# Patient Record
Sex: MALE | Race: WHITE
[De-identification: names, ages, dates, MRNs, and addresses within clinical notes are randomized per-mention and may not be internally consistent; named-entity substitution may affect disease eponyms.]

---

## 2017-11-15 ENCOUNTER — HOSPITAL ENCOUNTER (EMERGENCY)
Dept: HOSPITAL 11 - JP.ED | Age: 52
Discharge: HOME | End: 2017-11-15
Payer: MEDICARE

## 2017-11-15 DIAGNOSIS — N32.0: Primary | ICD-10-CM

## 2017-11-15 DIAGNOSIS — E03.9: ICD-10-CM

## 2017-11-15 DIAGNOSIS — Z79.899: ICD-10-CM

## 2017-11-15 DIAGNOSIS — B20: ICD-10-CM

## 2017-11-15 DIAGNOSIS — Z79.82: ICD-10-CM

## 2017-11-15 DIAGNOSIS — I10: ICD-10-CM

## 2017-11-15 DIAGNOSIS — Z88.8: ICD-10-CM

## 2017-11-15 DIAGNOSIS — Z87.891: ICD-10-CM

## 2017-11-15 NOTE — EDM.PDOC
ED HPI GENERAL MEDICAL PROBLEM





- General


Chief Complaint: Genitourinary Problem


Stated Complaint: UNABLE TO VOID


Time Seen by Provider: 11/15/17 12:54


Source of Information: Reports: Patient


History Limitations: Reports: No Limitations





- History of Present Illness


INITIAL COMMENTS - FREE TEXT/NARRATIVE: 


this patient has been having some urinary difficulties recently and yesterday 

underwent some type of a urodynamic procedure at the [facility] 

by Dr Cordero. He has been unable to void since about 0600 this am.








- Related Data


 Allergies











Allergy/AdvReac Type Severity Reaction Status Date / Time


 


abacavir Allergy Severe Fever Verified 11/15/17 12:39











Home Meds: 


 Home Meds





Alpha-D-Galactosidase [Beano] 2 each PO ASDIRECTED PRN 05/30/14 [History]


Ascorbic Acid [Vitamin C] 500 mg PO BID 05/30/14 [History]


Aspirin [Adult Low Dose Aspirin EC] 81 mg PO DAILY 05/30/14 [History]


Ca Cmb No.1/Vit D3/B-6/FA/B12 [Vitamin D3 1,000 Unit] 1 each PO DAILY 05/30/14 [

History]


Calcium Carbonate/Vitamin D3 [Calcium 600 + Vit D Tablet] 1 each PO BID 05/30/ 14 [History]


Cyanocobalamin (Vitamin B-12) [Vitamin B-12] 500 mcg PO DAILY 05/30/14 [History]


Diphenoxylate HCl/Atropine [Lomotil] 1 - 2 tab PO TID PRN 05/30/14 [History]


Efavirenz [Sustiva] 600 mg PO BEDTIME 05/30/14 [History]


Fish Oil/Omega-3 Fatty Acids [Fish Oil] 1 each PO DAILY 05/30/14 [History]


Levothyroxine Sodium [Synthroid] 175 mcg PO DAILY 05/30/14 [History]


Psyllium with Sucrose [Metamucil] 1 each PO ASDIRECTED PRN 05/30/14 [History]


Raltegravir [Isentress] 400 mg PO BID 05/30/14 [History]


Solifenacin [Vesicare] 10 mg PO DAILY 05/30/14 [History]


Tamsulosin [Tamsulosin 24 Hr] 0.4 mg PO DAILY 05/30/14 [History]


lamiVUDine/Zidovudine [Combivir 150-300 MG] 1 tab PO BID 05/30/14 [History]


Alendronate Sodium [Fosamax] 70 mg PO ASDIRECTED 11/15/17 [History]


Amoxicillin 1 tab PO ASDIRECTED PRN 11/15/17 [History]


Baclofen 10 mg PO BID 11/15/17 [History]


Cephalexin 1 tab PO ONETIME 11/15/17 [History]


Finasteride 1 tab PO DAILY 11/15/17 [History]


Phosphorus #1 [Virt-Phos 250 Neutral Tablet] 1 tab PO BID 11/15/17 [History]


Potassium Citrate [Potassium Citrate ER] 1 tab PO BID 11/15/17 [History]


Sodium Bicarbonate 2 tab PO BID 11/15/17 [History]


oxyCODONE 1 tab PO ASDIRECTED PRN 11/15/17 [History]











Past Medical History


Cardiovascular History: Reports: Hypertension


Gastrointestinal History: Reports: Other (See Below)


Other Gastrointestinal History: tumor 2009 removal in left groin, right groin 

tumor removal in 2014


Genitourinary History: Reports: Other (See Below)


Other Genitourinary History: cystogram of bladder 2017


Musculoskeletal History: Reports: Fracture


Other Musculoskeletal History: fracture right wrist and right hip 2015 screws 

in hip


Neurological History: Reports: Other (See Below)


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


Endocrine/Metabolic History: Reports: Hypothyroidism


Immunologic History: Reports: HIV


Oncologic (Cancer) History: Reports: Lung, Squamous Cell Carcinoma, Other (See 

Below)


Other Oncologic History: anal cancer





- Infectious Disease History


Infectious Disease History: Reports: HIV-Human Immunodeficiency Virus





- Past Surgical History


Respiratory Surgical History: Reports: Lung Resection


Other Respiratory Surgeries/Procedures: right lung resection 2008


GI Surgical History: Reports: Other (See Below)


Other GI Surgeries/Procedures: anal cancer in 2009 treated with radiation and 

chemo





Social & Family History





- Tobacco Use


Smoking Status *Q: Former Smoker


Years of Tobacco use: 25


Packs/Tins Daily: 1


Used Tobacco, but Quit: Yes


Month Tobacco Last Used: december


Second Hand Smoke Exposure: No





- Caffeine Use


Caffeine Use: Reports: Coffee





- Alcohol Use


Days Per Week of Alcohol Use: 0





- Recreational Drug Use


Recreational Drug Use: No





ED ROS GENERAL





- Review of Systems


Review Of Systems: ROS reveals no pertinent complaints other than HPI.





ED EXAM, RENAL/





- Physical Exam


Exam: See Below


Exam Limited By: No Limitations (in [place] by Dr. Louie Flores in)


General Appearance: Alert, WD/WN ( at the), No Apparent Distress ( [place] 

[facility] at the he has been unable to void)


 (Male) Exam: Other ( the bladder is distended but is nontender. Rectal exam 

was not done. This patient has had some previous malignancies in the scrotum 

and groin areas)





Course





- Vital Signs


Last Recorded V/S: 





 Last Vital Signs











Temp  36.8 C   11/15/17 13:25


 


Pulse  81   11/15/17 13:25


 


Resp  16   11/15/17 13:25


 


BP  163/107 H  11/15/17 13:25


 


Pulse Ox  94 L  11/15/17 13:25














- Orders/Labs/Meds


Meds: 





Medications














Discontinued Medications














Generic Name Dose Route Start Last Admin





  Trade Name Dilia  PRN Reason Stop Dose Admin


 


Lidocaine HCl  10 ml  11/15/17 13:47  11/15/17 13:51





  Xylocaine 2% Jelly  MUCMEM  11/15/17 13:48  10 ml





  ONETIME ONE   Administration














- Re-Assessments/Exams


Free Text/Narrative Re-Assessment/Exam: 





11/15/17 14:05


 a Guzman catheter was placed. See nurse's note for the amount of drainage


. A call was placed to the urology clinic and the nurse said that he can be 

seen at 1:15 tomorrow


. He should arrive at 1:00 PM.





Departure





- Departure


Time of Disposition: 14:06


Disposition: Home, Self-Care 01


Condition: Fair


Clinical Impression: 


 Bladder outlet obstruction








- Discharge Information


Referrals: 


PCP,None [Primary Care Provider] - 


Additional Instructions: 


followup tomorrow at 1:00 pm tomorrow at the Urology clinic to see Dr Cordero

## 2018-02-15 ENCOUNTER — HOSPITAL ENCOUNTER (EMERGENCY)
Dept: HOSPITAL 11 - JP.ED | Age: 53
Discharge: HOME | End: 2018-02-15
Payer: MEDICARE

## 2018-02-15 DIAGNOSIS — Z87.891: ICD-10-CM

## 2018-02-15 DIAGNOSIS — I10: ICD-10-CM

## 2018-02-15 DIAGNOSIS — E03.9: ICD-10-CM

## 2018-02-15 DIAGNOSIS — Z88.8: ICD-10-CM

## 2018-02-15 DIAGNOSIS — Z79.82: ICD-10-CM

## 2018-02-15 DIAGNOSIS — B20: ICD-10-CM

## 2018-02-15 DIAGNOSIS — R33.8: Primary | ICD-10-CM

## 2018-02-15 DIAGNOSIS — Z79.899: ICD-10-CM

## 2018-02-15 NOTE — EDM.PDOC
ED HPI GENERAL MEDICAL PROBLEM





- General


Chief Complaint: Genitourinary Problem


Stated Complaint: CAN'T URINATE


Time Seen by Provider: 02/15/18 00:30


Source of Information: Reports: Patient


History Limitations: Reports: No Limitations





- History of Present Illness


INITIAL COMMENTS - FREE TEXT/NARRATIVE: 





52-year-old male who has a history of chronic lymphedema of the scrotum and 

also urinary retention has again developed acute urinary retention for the last 

10 hours. No fevers or chills. He also has a history of enlarged prostate.


Onset: Gradual


Duration: Hour(s): (Last 10 hours)


Severity: Moderate


Associated Symptoms: Denies: Fever/Chills, Nausea/Vomiting, Shortness of Breath

, Weakness


  ** bladder


Pain Score (Numeric/FACES): 5





- Related Data


 Allergies











Allergy/AdvReac Type Severity Reaction Status Date / Time


 


abacavir Allergy Severe Fever Verified 02/15/18 00:43











Home Meds: 


 Home Meds





Alpha-D-Galactosidase [Beano] 2 each PO ASDIRECTED PRN 05/30/14 [History]


Ascorbic Acid [Vitamin C] 500 mg PO DAILY 05/30/14 [History]


Aspirin [Adult Low Dose Aspirin EC] 81 mg PO DAILY 05/30/14 [History]


Ca Cmb No.1/Vit D3/B-6/FA/B12 [Vitamin D3 1,000 Unit] 1 each PO DAILY 05/30/14 [

History]


Calcium Carbonate/Vitamin D3 [Calcium 600 + Vit D Tablet] 1 each PO BID 05/30/ 14 [History]


Cyanocobalamin (Vitamin B-12) [Vitamin B-12] 500 mcg PO DAILY 05/30/14 [History]


Diphenoxylate HCl/Atropine [Lomotil] 1 - 2 tab PO TID PRN 05/30/14 [History]


Efavirenz [Sustiva] 600 mg PO BEDTIME 05/30/14 [History]


Fish Oil/Omega-3 Fatty Acids [Fish Oil] 1 each PO DAILY 05/30/14 [History]


Levothyroxine Sodium [Synthroid] 175 mcg PO DAILY 05/30/14 [History]


Psyllium with Sucrose [Metamucil] 1 each PO ASDIRECTED PRN 05/30/14 [History]


Raltegravir [Isentress] 400 mg PO BID 05/30/14 [History]


Solifenacin [Vesicare] 10 mg PO DAILY 05/30/14 [History]


Tamsulosin [Tamsulosin 24 Hr] 0.4 mg PO DAILY 05/30/14 [History]


lamiVUDine/Zidovudine [Combivir 150-300 MG] 1 tab PO BID 05/30/14 [History]


Alendronate Sodium [Fosamax] 70 mg PO ASDIRECTED 11/15/17 [History]


Amoxicillin 1 tab PO ASDIRECTED PRN 11/15/17 [History]


Baclofen 10 mg PO BID 11/15/17 [History]


Finasteride 1 tab PO DAILY 11/15/17 [History]


Phosphorus #1 [Virt-Phos 250 Neutral Tablet] 1 tab PO BID 11/15/17 [History]


Potassium Citrate [Potassium Citrate ER] 1 tab PO BID 11/15/17 [History]


Sodium Bicarbonate 2 tab PO BID 11/15/17 [History]


oxyCODONE 1 tab PO Q6H PRN 11/15/17 [History]


Dapsone [Dapsone] 1 tab PO DAILY 02/15/18 [History]











Past Medical History


Cardiovascular History: Reports: Hypertension


Gastrointestinal History: Reports: Other (See Below)


Other Gastrointestinal History: tumor 2009 removal in left groin, right groin 

tumor removal in 2014


Genitourinary History: Reports: Prostate Disorder, Other (See Below)


Other Genitourinary History: cystogram of bladder 2017


Musculoskeletal History: Reports: Fracture


Other Musculoskeletal History: fracture right wrist and right hip 2015 screws 

in hip


Neurological History: Reports: Other (See Below)


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


Endocrine/Metabolic History: Reports: Hypothyroidism


Hematologic History: Reports: Blood Transfusion(s)


Immunologic History: Reports: HIV


Oncologic (Cancer) History: Reports: Lung, Squamous Cell Carcinoma, Other (See 

Below)


Other Oncologic History: anal cancer





- Infectious Disease History


Infectious Disease History: Reports: HIV-Human Immunodeficiency Virus





- Past Surgical History


Respiratory Surgical History: Reports: Lung Resection


Other Respiratory Surgeries/Procedures: right lung resection 2008


GI Surgical History: Reports: Colonoscopy, Other (See Below)


Other GI Surgeries/Procedures: anal cancer in 2009 treated with radiation and 

chemo





Social & Family History





- Tobacco Use


Smoking Status *Q: Former Smoker


Years of Tobacco use: 25


Packs/Tins Daily: 1


Used Tobacco, but Quit: Yes


Month Tobacco Last Used: 2008


Second Hand Smoke Exposure: No





- Caffeine Use


Caffeine Use: Reports: Coffee





- Alcohol Use


Days Per Week of Alcohol Use: 0





- Recreational Drug Use


Recreational Drug Use: No





ED ROS GENERAL





- Review of Systems


Review Of Systems: See Below


Constitutional: Denies: Fever


Respiratory: Denies: Shortness of Breath


GI/Abdominal: Reports: Abdominal Pain.  Denies: Nausea, Vomiting


: Reports: Urinary Retention





ED EXAM, RENAL/





- Physical Exam


Exam: See Below


Exam Limited By: No Limitations


General Appearance: Alert, Mild Distress (Patient is fairly uncomfortable)


Respiratory/Chest: No Respiratory Distress


GI/Abdominal: Soft, Other (There is some fullness of the lower abdomen)


 (Male) Exam: Other (Marked scrotal swelling and erythema, according to the 

patient is chronic)


Neurological: Alert, Oriented





Course





- Vital Signs


Last Recorded V/S: 


 Last Vital Signs











Temp  98 F   02/15/18 00:49


 


Pulse  85   02/15/18 00:49


 


Resp  16   02/15/18 00:49


 


BP  167/104 H  02/15/18 00:49


 


Pulse Ox  96   02/15/18 00:49














- Orders/Labs/Meds


Orders: 


 Active Orders 24 hr











 Category Date Time Status


 


 Insert Guzman Catheter [Insert Urinary Catheter] [OM.PC] Care  02/15/18 01:15 

Ordered





 Q24H   


 


 Urinary Catheter Assessment [RC] ASDIRECTED Care  02/15/18 01:16 Active














- Re-Assessments/Exams


Free Text/Narrative Re-Assessment/Exam: 





02/15/18 01:09


A 16-gauge catheter was placed through the urethra into the bladder. The 

urethra at the penile head was strictured, there was very little resistance for 

the rest of the placement of the catheter. This was done under sterile 

conditions.


02/15/18 01:28


Bladder was released of 600-700 mL of urine and the symptoms markedly improved. 

Patient is going to make an appointment with his primary doctor on Friday 

morning which would be a day and a half, to get this removed and discuss the 

possible causes.





Departure





- Departure


Time of Disposition: 01:55


Disposition: Home, Self-Care 01


Condition: Good


Clinical Impression: 


 Retention of urine








- Discharge Information


Instructions:  Acute Urinary Retention, Male


Referrals: 


Jacinto Fung MD [Primary Care Provider] - 


Forms:  ED Department Discharge


Care Plan Goals: 


Make an appointment to have the Guzman tube removed on Friday, return sooner if 

you develop problems with the catheter or others concerns





- My Orders


Last 24 Hours: 


My Active Orders





02/15/18 01:15


Insert Guzman Catheter [Insert Urinary Catheter] [OM.PC] Q24H 





02/15/18 01:16


Urinary Catheter Assessment [RC] ASDIRECTED 














- Assessment/Plan


Last 24 Hours: 


My Active Orders





02/15/18 01:15


Insert Guzman Catheter [Insert Urinary Catheter] [OM.PC] Q24H 





02/15/18 01:16


Urinary Catheter Assessment [RC] ASDIRECTED

## 2018-03-31 ENCOUNTER — HOSPITAL ENCOUNTER (EMERGENCY)
Dept: HOSPITAL 11 - JP.ED | Age: 53
Discharge: HOME | End: 2018-03-31
Payer: MEDICARE

## 2018-03-31 DIAGNOSIS — Z79.899: ICD-10-CM

## 2018-03-31 DIAGNOSIS — B20: ICD-10-CM

## 2018-03-31 DIAGNOSIS — I10: ICD-10-CM

## 2018-03-31 DIAGNOSIS — T83.011A: Primary | ICD-10-CM

## 2018-03-31 DIAGNOSIS — Z88.8: ICD-10-CM

## 2018-03-31 DIAGNOSIS — Z79.82: ICD-10-CM

## 2018-03-31 DIAGNOSIS — Z87.891: ICD-10-CM

## 2018-03-31 DIAGNOSIS — E03.9: ICD-10-CM

## 2018-03-31 NOTE — EDM.PDOC
ED HPI GENERAL MEDICAL PROBLEM





- General


Chief Complaint: Genitourinary Problem


Stated Complaint: CATH ISSUES


Time Seen by Provider: 03/31/18 22:50


Source of Information: Reports: Patient


History Limitations: Reports: No Limitations





- History of Present Illness


INITIAL COMMENTS - FREE TEXT/NARRATIVE: 





53-year-old male with an indwelling Guzman catheter had a malfunction of the 

catheter bag as it was leaking. He came in because he thought he had to have 

the Guzman replaced, he was unaware that the bag itself could be replaced. He 

has no symptoms.


Onset: Unknown/Unsure





- Related Data


 Allergies











Allergy/AdvReac Type Severity Reaction Status Date / Time


 


abacavir Allergy Severe Fever Verified 03/31/18 22:17











Home Meds: 


 Home Meds





Alpha-D-Galactosidase [Beano] 2 each PO ASDIRECTED PRN 05/30/14 [History]


Ascorbic Acid [Vitamin C] 500 mg PO DAILY 05/30/14 [History]


Aspirin [Adult Low Dose Aspirin EC] 81 mg PO DAILY 05/30/14 [History]


Ca Cmb No.1/Vit D3/B-6/FA/B12 [Vitamin D3 1,000 Unit] 1 each PO DAILY 05/30/14 [

History]


Calcium Carbonate/Vitamin D3 [Calcium 600 + Vit D Tablet] 1 each PO DAILY 05/30/ 14 [History]


Cyanocobalamin (Vitamin B-12) [Vitamin B-12] 500 mcg PO DAILY 05/30/14 [History]


Diphenoxylate HCl/Atropine [Lomotil] 1 - 2 tab PO TID PRN 05/30/14 [History]


Efavirenz [Sustiva] 600 mg PO BEDTIME 05/30/14 [History]


Fish Oil/Omega-3 Fatty Acids [Fish Oil] 1 each PO DAILY 05/30/14 [History]


Levothyroxine Sodium [Synthroid] 175 mcg PO DAILY 05/30/14 [History]


Psyllium with Sucrose [Metamucil] 1 each PO ASDIRECTED PRN 05/30/14 [History]


Raltegravir [Isentress] 400 mg PO BID 05/30/14 [History]


Solifenacin [Vesicare] 10 mg PO DAILY 05/30/14 [History]


Tamsulosin [Tamsulosin 24 Hr] 0.4 mg PO DAILY 05/30/14 [History]


lamiVUDine/Zidovudine [Combivir 150-300 MG] 1 tab PO BID 05/30/14 [History]


Alendronate Sodium [Fosamax] 70 mg PO ASDIRECTED 11/15/17 [History]


Amoxicillin 1 tab PO ASDIRECTED PRN 11/15/17 [History]


Baclofen 10 mg PO BID 11/15/17 [History]


Finasteride 1 tab PO DAILY 11/15/17 [History]


Phosphorus #1 [Virt-Phos 250 Neutral Tablet] 1 tab PO DAILY 11/15/17 [History]


Potassium Citrate [Potassium Citrate ER] 1 tab PO BID 11/15/17 [History]


Sodium Bicarbonate 2 tab PO BID 11/15/17 [History]


oxyCODONE 1 tab PO Q6H PRN 11/15/17 [History]


Dapsone 1 tab PO DAILY 02/15/18 [History]











Past Medical History


HEENT History: Reports: Impaired Vision


Cardiovascular History: Reports: Hypertension


Gastrointestinal History: Reports: Other (See Below)


Other Gastrointestinal History: tumor 2009 removal in left groin, right groin 

tumor removal in 2014


Genitourinary History: Reports: Prostate Disorder, Other (See Below)


Other Genitourinary History: cystogram of bladder 2017


Musculoskeletal History: Reports: Fracture


Other Musculoskeletal History: fracture right wrist and right hip 2015 screws 

in hip


Neurological History: Reports: Other (See Below)


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


Endocrine/Metabolic History: Reports: Hypothyroidism


Hematologic History: Reports: Blood Transfusion(s)


Immunologic History: Reports: HIV


Oncologic (Cancer) History: Reports: Lung, Squamous Cell Carcinoma, Other (See 

Below)


Other Oncologic History: anal cancer





- Infectious Disease History


Infectious Disease History: Reports: HIV-Human Immunodeficiency Virus





- Past Surgical History


Respiratory Surgical History: Reports: Lung Resection


Other Respiratory Surgeries/Procedures: right lung resection 2008


GI Surgical History: Reports: Colonoscopy, Other (See Below)


Other GI Surgeries/Procedures: anal cancer in 2009 treated with radiation and 

chemo





Social & Family History





- Tobacco Use


Smoking Status *Q: Former Smoker


Years of Tobacco use: 25


Packs/Tins Daily: 1


Used Tobacco, but Quit: Yes


Month/Year Tobacco Last Used: 2008


Second Hand Smoke Exposure: No





- Caffeine Use


Caffeine Use: Reports: Coffee





- Alcohol Use


Days Per Week of Alcohol Use: 0





- Recreational Drug Use


Recreational Drug Use: No





ED ROS GENERAL





- Review of Systems


Review Of Systems: See Below


Constitutional: Denies: Fever, Chills


Respiratory: Denies: Shortness of Breath


GI/Abdominal: Denies: Abdominal Pain, Nausea, Vomiting





ED EXAM, RENAL/





- Physical Exam


Exam: See Below


General Appearance: Alert, No Apparent Distress


Respiratory/Chest: No Respiratory Distress


Cardiovascular: Regular Rate, Rhythm


GI/Abdominal: Soft, Non-Tender





Course





- Vital Signs


Last Recorded V/S: 


 Last Vital Signs











Temp  96.9 F   03/31/18 22:40


 


Pulse  89   03/31/18 22:40


 


Resp  18   03/31/18 22:40


 


BP  145/103 H  03/31/18 22:40


 


Pulse Ox  94 L  03/31/18 22:40














- Re-Assessments/Exams


Free Text/Narrative Re-Assessment/Exam: 





03/31/18 22:56


Catheter bag was replaced without difficulty. Patient was discharged.





Departure





- Departure


Time of Disposition: 23:06


Disposition: Home, Self-Care 01


Condition: Good


Clinical Impression: 


Malfunction of Guzman catheter


Qualifiers:


 Encounter type: initial encounter Qualified Code(s): T83.011A - Breakdown (

mechanical) of indwelling urethral catheter, initial encounter








- Discharge Information


Instructions:  Guzman Catheter Care, Adult


Referrals: 


Jacinto Fung MD [Primary Care Provider] - 


Forms:  ED Department Discharge


Care Plan Goals: 


Continue current medications and care. Return if concerns.

## 2018-05-18 ENCOUNTER — HOSPITAL ENCOUNTER (INPATIENT)
Dept: HOSPITAL 11 - JP.ED | Age: 53
LOS: 5 days | Discharge: HOME | DRG: 974 | End: 2018-05-23
Attending: INTERNAL MEDICINE | Admitting: INTERNAL MEDICINE
Payer: MEDICARE

## 2018-05-18 DIAGNOSIS — B20: ICD-10-CM

## 2018-05-18 DIAGNOSIS — R65.21: ICD-10-CM

## 2018-05-18 DIAGNOSIS — N40.1: ICD-10-CM

## 2018-05-18 DIAGNOSIS — I10: ICD-10-CM

## 2018-05-18 DIAGNOSIS — N17.9: ICD-10-CM

## 2018-05-18 DIAGNOSIS — Z87.891: ICD-10-CM

## 2018-05-18 DIAGNOSIS — Z88.8: ICD-10-CM

## 2018-05-18 DIAGNOSIS — A41.9: Primary | ICD-10-CM

## 2018-05-18 DIAGNOSIS — N30.00: ICD-10-CM

## 2018-05-18 DIAGNOSIS — Z85.118: ICD-10-CM

## 2018-05-18 DIAGNOSIS — N13.8: ICD-10-CM

## 2018-05-18 DIAGNOSIS — N18.3: ICD-10-CM

## 2018-05-18 DIAGNOSIS — Z92.21: ICD-10-CM

## 2018-05-18 DIAGNOSIS — C20: ICD-10-CM

## 2018-05-18 DIAGNOSIS — Z85.048: ICD-10-CM

## 2018-05-18 DIAGNOSIS — I12.9: ICD-10-CM

## 2018-05-18 DIAGNOSIS — A41.59: ICD-10-CM

## 2018-05-18 DIAGNOSIS — A41.81: ICD-10-CM

## 2018-05-18 DIAGNOSIS — E03.9: ICD-10-CM

## 2018-05-18 DIAGNOSIS — C77.4: ICD-10-CM

## 2018-05-18 DIAGNOSIS — R09.02: ICD-10-CM

## 2018-05-18 DIAGNOSIS — N39.0: ICD-10-CM

## 2018-05-18 DIAGNOSIS — Z79.82: ICD-10-CM

## 2018-05-18 DIAGNOSIS — Z79.899: ICD-10-CM

## 2018-05-18 PROCEDURE — 93005 ELECTROCARDIOGRAM TRACING: CPT

## 2018-05-18 PROCEDURE — 96365 THER/PROPH/DIAG IV INF INIT: CPT

## 2018-05-18 PROCEDURE — 82803 BLOOD GASES ANY COMBINATION: CPT

## 2018-05-18 PROCEDURE — 86140 C-REACTIVE PROTEIN: CPT

## 2018-05-18 PROCEDURE — 96361 HYDRATE IV INFUSION ADD-ON: CPT

## 2018-05-18 PROCEDURE — 87186 SC STD MICRODIL/AGAR DIL: CPT

## 2018-05-18 PROCEDURE — 96375 TX/PRO/DX INJ NEW DRUG ADDON: CPT

## 2018-05-18 PROCEDURE — 87088 URINE BACTERIA CULTURE: CPT

## 2018-05-18 PROCEDURE — 71046 X-RAY EXAM CHEST 2 VIEWS: CPT

## 2018-05-18 PROCEDURE — 99285 EMERGENCY DEPT VISIT HI MDM: CPT

## 2018-05-18 PROCEDURE — 36415 COLL VENOUS BLD VENIPUNCTURE: CPT

## 2018-05-18 PROCEDURE — 36600 WITHDRAWAL OF ARTERIAL BLOOD: CPT

## 2018-05-18 PROCEDURE — 87077 CULTURE AEROBIC IDENTIFY: CPT

## 2018-05-18 PROCEDURE — 81001 URINALYSIS AUTO W/SCOPE: CPT

## 2018-05-18 PROCEDURE — 87040 BLOOD CULTURE FOR BACTERIA: CPT

## 2018-05-18 PROCEDURE — 87086 URINE CULTURE/COLONY COUNT: CPT

## 2018-05-18 PROCEDURE — 85025 COMPLETE CBC W/AUTO DIFF WBC: CPT

## 2018-05-18 PROCEDURE — 80053 COMPREHEN METABOLIC PANEL: CPT

## 2018-05-18 PROCEDURE — 83605 ASSAY OF LACTIC ACID: CPT

## 2018-05-18 RX ADMIN — DIBASIC SODIUM PHOSPHATE, MONOBASIC POTASSIUM PHOSPHATE AND MONOBASIC SODIUM PHOSPHATE SCH: 852; 155; 130 TABLET ORAL at 21:51

## 2018-05-18 NOTE — PCM.HP
H&P History of Present Illness





- General


Date of Service: 05/18/18


Admit Problem/Dx: 


 Admission Diagnosis/Problem





Admission Diagnosis/Problem      Septic shock








Source of Information: Patient, Provider, RN Notes Reviewed


History Limitations: Reports: No Limitations





- History of Present Illness


Initial Comments - Free Text/Narative: 





Mr. Garvey is a 53-year-old gentleman who is admitted through the emergency 

department with septic shock likely secondary to underlying urinary tract 

infection. He was feeling well this morning, went into the clinic to have his 

catheter changed and return home. Shortly after he returned home noted severe 

pain in his pelvic region with no urinary output and development of a fever. He 

presented to the emergency department for further evaluation and was found to 

be tachycardic, this was felt to be likely a sinus tachycardia. He did receive 

a dose of a Adenocard with no change in the rhythm. Urinalysis shows evidence 

of urinary tract infection, no other obvious source of in traction has been 

identified on evaluation in the emergency department. He has had significant 

temperature elevation to almost 104 associated with a rapid respiratory rate 

and borderline oxygenation. Chest x-ray shows perhaps mild lower lung 

infiltrates versus atelectasis. White blood cell count is low. Blood and urine 

cultures have been obtained and he has received vigorous IV fluid replacement 

while in the emergency department. Lactic acid level has been obtained and 

found to be elevated.





- Related Data


Allergies/Adverse Reactions: 


 Allergies











Allergy/AdvReac Type Severity Reaction Status Date / Time


 


abacavir Allergy Severe Fever Verified 05/18/18 13:33











Home Medications: 


 Home Meds





Alpha-D-Galactosidase [Beano] 2 each PO ASDIRECTED PRN 05/30/14 [History]


Ascorbic Acid [Vitamin C] 500 mg PO DAILY 05/30/14 [History]


Aspirin [Adult Low Dose Aspirin EC] 81 mg PO DAILY 05/30/14 [History]


Ca Cmb No.1/Vit D3/B-6/FA/B12 [Vitamin D3 1,000 Unit] 1 each PO DAILY 05/30/14 [

History]


Calcium Carbonate/Vitamin D3 [Calcium 600 + Vit D Tablet] 1 each PO DAILY 05/30/ 14 [History]


Cyanocobalamin (Vitamin B-12) [Vitamin B-12] 500 mcg PO DAILY 05/30/14 [History]


Diphenoxylate HCl/Atropine [Lomotil] 1 - 2 tab PO TID PRN 05/30/14 [History]


Efavirenz [Sustiva] 600 mg PO BEDTIME 05/30/14 [History]


Fish Oil/Omega-3 Fatty Acids [Fish Oil] 1 each PO DAILY 05/30/14 [History]


Levothyroxine Sodium [Synthroid] 175 mcg PO DAILY 05/30/14 [History]


Psyllium with Sucrose [Metamucil] 1 each PO ASDIRECTED PRN 05/30/14 [History]


Raltegravir [Isentress] 400 mg PO BID 05/30/14 [History]


Solifenacin [Vesicare] 10 mg PO DAILY 05/30/14 [History]


Tamsulosin [Tamsulosin 24 Hr] 0.4 mg PO DAILY 05/30/14 [History]


lamiVUDine/Zidovudine [Combivir 150-300 MG] 1 tab PO BID 05/30/14 [History]


Alendronate Sodium [Fosamax] 70 mg PO ASDIRECTED 11/15/17 [History]


Amoxicillin 1 tab PO ASDIRECTED PRN 11/15/17 [History]


Baclofen 10 mg PO BID 11/15/17 [History]


Finasteride 1 tab PO DAILY 11/15/17 [History]


Phosphorus #1 [Virt-Phos 250 Neutral Tablet] 1 tab PO DAILY 11/15/17 [History]


Potassium Citrate [Potassium Citrate ER] 1 tab PO BID 11/15/17 [History]


Sodium Bicarbonate 2 tab PO BID 11/15/17 [History]


oxyCODONE 1 tab PO Q6H PRN 11/15/17 [History]


Dapsone 1 tab PO DAILY 02/15/18 [History]











Past Medical History


HEENT History: Reports: Impaired Vision


Cardiovascular History: Reports: Hypertension


Gastrointestinal History: Reports: Other (See Below)


Other Gastrointestinal History: tumor 2009 removal in left groin, right groin 

tumor removal in 2014


Genitourinary History: Reports: Prostate Disorder, Other (See Below)


Other Genitourinary History: cystogram of bladder 2017


Musculoskeletal History: Reports: Fracture


Other Musculoskeletal History: fracture right wrist and right hip 2015 screws 

in hip


Neurological History: Reports: Other (See Below)


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


Endocrine/Metabolic History: Reports: Hypothyroidism


Hematologic History: Reports: Blood Transfusion(s)


Immunologic History: Reports: HIV


Oncologic (Cancer) History: Reports: Lung, Squamous Cell Carcinoma, Other (See 

Below)


Other Oncologic History: anal cancer





- Infectious Disease History


Infectious Disease History: Reports: HIV-Human Immunodeficiency Virus





- Past Surgical History


Respiratory Surgical History: Reports: Lung Resection


Other Respiratory Surgeries/Procedures: right lung resection 2008


GI Surgical History: Reports: Colonoscopy, Other (See Below)


Other GI Surgeries/Procedures: anal cancer in 2009 treated with radiation and 

chemo





Social & Family History





- Tobacco Use


Smoking Status *Q: Former Smoker


Used Tobacco, but Quit: Yes


Month/Year Tobacco Last Used: 08





- Caffeine Use


Caffeine Use: Reports: Coffee





H&P Review of Systems





- Review of Systems:


Review Of Systems: See Below


General: Reports: Fever, Chills, Weakness


HEENT: Reports: No Symptoms


Pulmonary: Reports: No Symptoms


Cardiovascular: Reports: No Symptoms


Gastrointestinal: Reports: Abdominal Pain (Pelvic), Decreased Appetite, Nausea.

  Denies: Black Stool, Bloody Stool, Constipation, Diarrhea, Difficulty 

Swallowing, Distension, Vomiting


Genitourinary: Reports: Other (Indwelling Guzman catheter)


Musculoskeletal: Reports: No Symptoms


Skin: Reports: No Symptoms


Psychiatric: Reports: No Symptoms


Neurological: Reports: No Symptoms


Hematologic/Lymphatic: Reports: No Symptoms


Immunologic: Reports: Other (HIV)





Exam





- Exam


Exam: See Below





- Vital Signs


Vital Signs: 


 Last Vital Signs











Temp  103.7 F H  05/18/18 15:45


 


Pulse  164 H  05/18/18 14:24


 


Resp  27 H  05/18/18 14:24


 


BP  118/73   05/18/18 14:24


 


Pulse Ox  89 L  05/18/18 14:24











Weight: 150 lb





- Exam


Quality Assessment: Supplemental Oxygen, Urinary Catheter, DVT Prophylaxis


General: Alert, Oriented, Cooperative, Mild Distress


HEENT: Conjunctiva Clear, Hearing Intact, Normal Nasal Septum, Posterior 

Pharynx Clear, Pupils Equal.  No: Mucosa Moist & Pink


Neck: Supple, Trachea Midline, +2 Carotid Pulse wo Bruit


Lungs: Clear to Auscultation, Normal Respiratory Effort


Cardiovascular: Regular Rhythm, Normal S1, Normal S2, Tachycardia.  No: 

Systolic Murmur, Diastolic Murmur


GI/Abdominal Exam: Soft, Non-Tender, No Organomegaly, No Distention


Back Exam: Normal Inspection, Full Range of Motion


Extremities: Non-Tender, No Pedal Edema


Skin: Warm, Dry, Intact


Neurological: Cranial Nerves Intact, Strength Equal Bilateral, Normal Speech, 

Normal Tone, Sensation Intact.  No: Focal Deficit


Neuro Extensive - Mental Status: Alert, Oriented x3, Normal Mood/Affect, Normal 

Cognition, Memory Intact





- Patient Data


Lab Results Last 24 hrs: 


 Laboratory Results - last 24 hr











  05/18/18 05/18/18 05/18/18 Range/Units





  13:57 14:00 14:00 


 


WBC   3.6 L   (4.5-11.0)  K/uL


 


RBC   3.83 L   (4.30-5.90)  M/uL


 


Hgb   15.5 H   (12.0-15.0)  g/dL


 


Hct   44.2   (40.0-54.0)  %


 


MCV   115 H   (80-98)  fL


 


MCH   41 H   (27-31)  pg


 


MCHC   35   (32-36)  %


 


Plt Count   78 L   (150-400)  K/uL


 


Neut % (Auto)   62   (36-66)  %


 


Lymph % (Auto)   38   (24-44)  %


 


Mono % (Auto)   1 L   (2-6)  %


 


Eos % (Auto)   0 L   (2-4)  %


 


Baso % (Auto)   0   (0-1)  %


 


Puncture Site     


 


ABG pH     (7.350-7.450)  


 


ABG pCO2     (35.0-42.0)  mmHg


 


ABG pO2     (75.0-100.0)  mmHg


 


ABG HCO3     (22.0-26.0)  mmol/L


 


ABG Total CO2     (23.0-27.0)  mmol/L


 


ABG O2 Saturation     (95.0-98.0)  %


 


ABG O2 Content     (15.0-23.0)  %vol


 


ABG Base Excess     mm/L


 


ABG Hemoglobin     (13.5-18.0)  g/dL


 


ABG Oxyhemoglobin     %


 


ABG Carboxyhemoglobin     (0.0-1.6)  %


 


ABG Methemoglobin     %


 


David Test     


 


O2 Delivery Device     


 


Sodium    142  (140-148)  mmol/L


 


Potassium    4.0  (3.6-5.2)  mmol/L


 


Chloride    109 H  (100-108)  mmol/L


 


Carbon Dioxide    19 L  (21-32)  mmol/L


 


Anion Gap    18.0 H  (5.0-14.0)  mmol/L


 


BUN    26 H  (7-18)  mg/dL


 


Creatinine    2.0 H  (0.8-1.3)  mg/dL


 


Est Cr Clr Drug Dosing    40.16  mL/min


 


Estimated GFR (MDRD)    35 L  (>60)  


 


Glucose    129 H  ()  mg/dL


 


Lactic Acid     (0.4-2.0)  mmol/L


 


Calcium    8.5  (8.5-10.1)  mg/dL


 


Total Bilirubin    1.0  (0.2-1.0)  mg/dL


 


AST    54 H  (15-37)  U/L


 


ALT    57  (12-78)  U/L


 


Alkaline Phosphatase    147 H  ()  U/L


 


C-Reactive Protein  4.27 H    (0.0-0.3)  mg/dL


 


Total Protein    6.3 L  (6.4-8.2)  g/dL


 


Albumin    3.6  (3.4-5.0)  g/dL


 


Globulin    2.7  (2.3-3.5)  g/dL


 


Albumin/Globulin Ratio    1.3  (1.2-2.2)  


 


Urine Color     


 


Urine Appearance     


 


Urine pH     (4.5-8.0)  


 


Ur Specific Gravity     (1.008-1.030)  


 


Urine Protein     (NEGATIVE)  mg/dL


 


Urine Glucose (UA)     (NEGATIVE)  mg/dL


 


Urine Ketones     (NEGATIVE)  mg/dL


 


Urine Occult Blood     (NEGATIVE)  


 


Urine Nitrite     (NEGAITVE)  


 


Urine Bilirubin     (NEGATIVE)  


 


Urine Urobilinogen     (NORMAL)  mg/dL


 


Ur Leukocyte Esterase     (NEGATIVE)  


 


Urine RBC     (0-5)  


 


Urine WBC     (0-5)  


 


Ur Epithelial Cells     


 


Amorphous Sediment     


 


Urine Bacteria     


 


Urine Mucus     














  05/18/18 05/18/18 05/18/18 Range/Units





  14:00 14:03 15:24 


 


WBC     (4.5-11.0)  K/uL


 


RBC     (4.30-5.90)  M/uL


 


Hgb     (12.0-15.0)  g/dL


 


Hct     (40.0-54.0)  %


 


MCV     (80-98)  fL


 


MCH     (27-31)  pg


 


MCHC     (32-36)  %


 


Plt Count     (150-400)  K/uL


 


Neut % (Auto)     (36-66)  %


 


Lymph % (Auto)     (24-44)  %


 


Mono % (Auto)     (2-6)  %


 


Eos % (Auto)     (2-4)  %


 


Baso % (Auto)     (0-1)  %


 


Puncture Site    Rt radial  


 


ABG pH    7.398  (7.350-7.450)  


 


ABG pCO2    24.1 L  (35.0-42.0)  mmHg


 


ABG pO2    66.9 L  (75.0-100.0)  mmHg


 


ABG HCO3    14.5 L  (22.0-26.0)  mmol/L


 


ABG Total CO2    12.9 L  (23.0-27.0)  mmol/L


 


ABG O2 Saturation    92.2 L  (95.0-98.0)  %


 


ABG O2 Content    16.7  (15.0-23.0)  %vol


 


ABG Base Excess    -8.3  mm/L


 


ABG Hemoglobin    13.3 L  (13.5-18.0)  g/dL


 


ABG Oxyhemoglobin    89.4  %


 


ABG Carboxyhemoglobin    0.9  (0.0-1.6)  %


 


ABG Methemoglobin    2.1  %


 


David Test    Passed  


 


O2 Delivery Device    Nasal cannula  


 


Sodium     (140-148)  mmol/L


 


Potassium     (3.6-5.2)  mmol/L


 


Chloride     (100-108)  mmol/L


 


Carbon Dioxide     (21-32)  mmol/L


 


Anion Gap     (5.0-14.0)  mmol/L


 


BUN     (7-18)  mg/dL


 


Creatinine     (0.8-1.3)  mg/dL


 


Est Cr Clr Drug Dosing     mL/min


 


Estimated GFR (MDRD)     (>60)  


 


Glucose     ()  mg/dL


 


Lactic Acid  4.0 H    (0.4-2.0)  mmol/L


 


Calcium     (8.5-10.1)  mg/dL


 


Total Bilirubin     (0.2-1.0)  mg/dL


 


AST     (15-37)  U/L


 


ALT     (12-78)  U/L


 


Alkaline Phosphatase     ()  U/L


 


C-Reactive Protein     (0.0-0.3)  mg/dL


 


Total Protein     (6.4-8.2)  g/dL


 


Albumin     (3.4-5.0)  g/dL


 


Globulin     (2.3-3.5)  g/dL


 


Albumin/Globulin Ratio     (1.2-2.2)  


 


Urine Color   Orange   


 


Urine Appearance   Cloudy   


 


Urine pH   5.0   (4.5-8.0)  


 


Ur Specific Gravity   1.010   (1.008-1.030)  


 


Urine Protein   30 H   (NEGATIVE)  mg/dL


 


Urine Glucose (UA)   Normal   (NEGATIVE)  mg/dL


 


Urine Ketones   Negative   (NEGATIVE)  mg/dL


 


Urine Occult Blood   Large   (NEGATIVE)  


 


Urine Nitrite   Negative   (NEGAITVE)  


 


Urine Bilirubin   Negative   (NEGATIVE)  


 


Urine Urobilinogen   Normal   (NORMAL)  mg/dL


 


Ur Leukocyte Esterase   Large   (NEGATIVE)  


 


Urine RBC   20-30 H   (0-5)  


 


Urine WBC   10-20 H   (0-5)  


 


Ur Epithelial Cells   Not seen   


 


Amorphous Sediment   Not seen   


 


Urine Bacteria   Few   


 


Urine Mucus   Not seen   











Result Diagrams: 


 05/18/18 14:00





 05/18/18 14:00





*Q Meaningful Use (ADM)





- VTE Risk Assess *Q


Each Risk Factor Represents 1 Point: Age 41 - 59 years, Swollen Legs, Current, 

Sepsis


Total Score 1 Point Risk Factors: 3


Each Risk Factor Represents 2 Points: Malignancy (present or previous)


Total Score 2 Point Risk Factors: 2


Each Risk Factor Represents 3 Points: None


Total Score 3 Point Risk Factors: 0


Each Risk Factor Represents 5 Points: None


Total Score 5 Point Risk Factors: 0


Venous Thromboembolism Risk Factor Score *Q: 5


Problem List Initiated/Reviewed/Updated: Yes


Orders Last 24hrs: 


 Active Orders 24 hr











 Category Date Time Status


 


 Patient Status Manage Transfer [TRANSFER] Routine ADT  05/18/18 16:15 Ordered


 


 Cardiac Monitoring [RC] .As Directed Care  05/18/18 14:24 Active


 


 EKG Documentation Completion [RC] ASDIRECTED Care  05/18/18 13:52 Active


 


 Oxygen Therapy Adult [Oxygen Therapy, ED] [RC] Care  05/18/18 14:25 Active





 ASDIRECTED   


 


 Vital Signs [RC] Q1H Care  05/18/18 13:57 Active


 


 Chest 2V [CR] Stat Exams  05/18/18 14:26 Taken


 


 CULTURE BLOOD [BC] Urgent Lab  05/18/18 14:00 Received


 


 CULTURE BLOOD [BC] Urgent Lab  05/18/18 14:14 Received


 


 CULTURE URINE [RM] Stat Lab  05/18/18 14:03 Ordered


 


 UA W/MICROSCOPIC [URIN] Urgent Lab  05/18/18 14:03 Ordered


 


 Sodium Chloride 0.9% [Normal Saline] 1,000 ml Med  05/18/18 15:38 Active





 IV .BOLUS   


 


 cefTRIAXone [Rocephin] 2 gm Med  05/18/18 14:00 Active





 Sodium Chloride 0.9% [Normal Saline] 50 ml   





 IV Q8H   


 


 Blood Culture x2 Reflex Set [OM.PC] Urgent Oth  05/18/18 13:57 Ordered


 


 Resuscitation Status Routine Resus Stat  05/18/18 16:18 Ordered


 


 EKG 12 Lead [EK] Stat Ther  05/18/18 13:52 Ordered








 Medication Orders





Ceftriaxone Sodium 2 gm/ (Sodium Chloride)  50 mls @ 100 mls/hr IV Q8H LAUREN


   Last Admin: 05/18/18 14:53  Dose: 100 mls/hr


Sodium Chloride (Normal Saline)  1,000 mls @ 1,000 mls/hr IV .BOLUS ONE


   Stop: 05/18/18 16:37


   Last Admin: 05/18/18 15:43  Dose: 1,000 mls/hr








Assessment/Plan Comment:: 





ASSESSMENT AND PLAN





SEPTIC SHOCK-likely urinary source, onset of severe symptoms after change in 

neck indwelling catheter this morning. No other obvious source has been 

identified on evaluation in the emergency department. He does have likely some 

underlying immune compromise related to his long-standing diagnosis of HIV. 

Although he states his viral count has been undetectable over the past year.


-Vigorous IV fluid replacement per sepsis protocol


-Blood and urine cultures pending


-Admission to ICU for more close monitoring


-IV meropenem and vancomycin pending culture results


-Follow-up lactic acid level later this evening and in a.m.





HYPOXIA-likely secondary to sepsis, oh evidence of underlying pulmonary 

infection at this time


-Repeat chest x-ray in a.m. after hydration


-Supplemental oxygen as needed


-Continuous pulse oximetry





SINUS TACHYCARDIA-secondary to septic shock, no change in rhythm with use of 

Adenocard. Rate has slowed with IV hydration.


-Cardiac monitoring





ACUTE ON CHRONIC KIDNEY INJURY-at baseline has chronic kidney disease stage III


-IV fluids as above


-Closely monitor urine output and renal function





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 Currently in remission with no 

evidence of active disease and no recent chemotherapy or radiation therapy





BLADDER OUTLET OBSTRUCTION SECONDARY TO BPH-indwelling Guzman catheter





HIV-well managed at the present time, by history viral count has been 

undetectable over the past year


-Continue outpatient medical regimen





MAINTENANCE ISSUES


-DVT prophylaxis; Lovenox 40 mg subcutaneous daily


-GI prophylaxis; not indicated


-Guzman catheter; chronic indwelling catheter secondary to bladder outlet 

obstruction


-Nutrition; regular diet


-Nicotine dependence; not indicated





CODE STATUS-FULL CODE





ADMISSION STATUS-patient will be admitted to inpatient status, expect at least 

a 2 night hospital stay for evaluation and management of problems as outlined 

above.  At the time of this admission I do not reasonably expected evaluation 

and management of this problem will require more than a 96 hour hospital stay.





DISPOSITION-anticipate discharge to home after the hospital stay.





PRIMARY CARE PROVIDER-Dr. Fung

## 2018-05-18 NOTE — EDM.PDOC
<OfficerPhilipp - Last Filed: 05/18/18 13:51>





ED HPI GENERAL MEDICAL PROBLEM





- General


Chief Complaint: Genitourinary Problem


Stated Complaint: no urine in his cath


Time Seen by Provider: 05/18/18 13:51


Source of Information: Reports: Patient, Old Records, RN Notes Reviewed


History Limitations: Reports: No Limitations





- History of Present Illness


INITIAL COMMENTS - FREE TEXT/NARRATIVE: 





53-year-old gentleman presents emergency department day complaint of catheter 

dysfunction.  He has a known history of HIV as well as rectal cancer currently 

on chemotherapy. He has had waves of nausea denies any pain has had catheter 

dysfunction throughout the night, reported the clinic today catheter was 

changed however he just doesn't seem to be functioning properly. Reported to 

the emergency department for further evaluation. Was found to have fever on 

initial vital signs check. Did admit to fever on and off last night 





- Related Data


 Allergies











Allergy/AdvReac Type Severity Reaction Status Date / Time


 


abacavir Allergy Severe Fever Verified 05/18/18 13:33











Home Meds: 


 Home Meds





Alpha-D-Galactosidase [Beano] 2 each PO ASDIRECTED PRN 05/30/14 [History]


Ascorbic Acid [Vitamin C] 500 mg PO DAILY 05/30/14 [History]


Aspirin [Adult Low Dose Aspirin EC] 81 mg PO DAILY 05/30/14 [History]


Ca Cmb No.1/Vit D3/B-6/FA/B12 [Vitamin D3 1,000 Unit] 1 each PO DAILY 05/30/14 [

History]


Calcium Carbonate/Vitamin D3 [Calcium 600 + Vit D Tablet] 1 each PO DAILY 05/30/ 14 [History]


Cyanocobalamin (Vitamin B-12) [Vitamin B-12] 500 mcg PO DAILY 05/30/14 [History]


Diphenoxylate HCl/Atropine [Lomotil] 1 - 2 tab PO TID PRN 05/30/14 [History]


Efavirenz [Sustiva] 600 mg PO BEDTIME 05/30/14 [History]


Fish Oil/Omega-3 Fatty Acids [Fish Oil] 1 each PO DAILY 05/30/14 [History]


Levothyroxine Sodium [Synthroid] 175 mcg PO DAILY 05/30/14 [History]


Psyllium with Sucrose [Metamucil] 1 each PO ASDIRECTED PRN 05/30/14 [History]


Raltegravir [Isentress] 400 mg PO BID 05/30/14 [History]


Solifenacin [Vesicare] 10 mg PO DAILY 05/30/14 [History]


Tamsulosin [Tamsulosin 24 Hr] 0.4 mg PO DAILY 05/30/14 [History]


lamiVUDine/Zidovudine [Combivir 150-300 MG] 1 tab PO BID 05/30/14 [History]


Alendronate Sodium [Fosamax] 70 mg PO ASDIRECTED 11/15/17 [History]


Amoxicillin 1 tab PO ASDIRECTED PRN 11/15/17 [History]


Baclofen 10 mg PO BID 11/15/17 [History]


Finasteride 1 tab PO DAILY 11/15/17 [History]


Phosphorus #1 [Virt-Phos 250 Neutral Tablet] 1 tab PO DAILY 11/15/17 [History]


Potassium Citrate [Potassium Citrate ER] 1 tab PO BID 11/15/17 [History]


Sodium Bicarbonate 2 tab PO BID 11/15/17 [History]


oxyCODONE 1 tab PO Q6H PRN 11/15/17 [History]


Dapsone 1 tab PO DAILY 02/15/18 [History]











Past Medical History


HEENT History: Reports: Impaired Vision


Cardiovascular History: Reports: Hypertension


Gastrointestinal History: Reports: Other (See Below)


Other Gastrointestinal History: tumor 2009 removal in left groin, right groin 

tumor removal in 2014


Genitourinary History: Reports: Prostate Disorder, Other (See Below)


Other Genitourinary History: cystogram of bladder 2017


Musculoskeletal History: Reports: Fracture


Other Musculoskeletal History: fracture right wrist and right hip 2015 screws 

in hip


Neurological History: Reports: Other (See Below)


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


Endocrine/Metabolic History: Reports: Hypothyroidism


Hematologic History: Reports: Blood Transfusion(s)


Immunologic History: Reports: HIV


Oncologic (Cancer) History: Reports: Lung, Squamous Cell Carcinoma, Other (See 

Below)


Other Oncologic History: anal cancer





- Infectious Disease History


Infectious Disease History: Reports: HIV-Human Immunodeficiency Virus





- Past Surgical History


Respiratory Surgical History: Reports: Lung Resection


Other Respiratory Surgeries/Procedures: right lung resection 2008


GI Surgical History: Reports: Colonoscopy, Other (See Below)


Other GI Surgeries/Procedures: anal cancer in 2009 treated with radiation and 

chemo





Social & Family History





- Tobacco Use


Smoking Status *Q: Former Smoker


Used Tobacco, but Quit: Yes


Month/Year Tobacco Last Used: 08





- Caffeine Use


Caffeine Use: Reports: Coffee





ED ROS GENERAL





- Review of Systems


Review Of Systems: See Below


Constitutional: Reports: Fever, Chills


HEENT: Reports: No Symptoms


Respiratory: Reports: No Symptoms


Cardiovascular: Reports: No Symptoms


GI/Abdominal: Reports: Nausea.  Denies: Vomiting


: Reports: Urinary Retention


Musculoskeletal: Reports: No Symptoms


Skin: Reports: No Symptoms


Neurological: Reports: No Symptoms





ED EXAM, RENAL/





- Physical Exam


Exam: See Below


Exam Limited By: No Limitations


General Appearance: Alert, WD/WN, No Apparent Distress


Eye Exam: Bilateral Eye: Normal Inspection


Head: Atraumatic, Normocephalic


Neck: Normal Inspection, Supple, Non-Tender, Full Range of Motion


Respiratory/Chest: No Respiratory Distress, Lungs Clear, Normal Breath Sounds, 

No Accessory Muscle Use


Cardiovascular: No Murmur, Tachycardia


GI/Abdominal: Soft, Distended


 (Male) Exam: No Hernia, Scrotal Swelling





Course





- Vital Signs


Last Recorded V/S: 


 Last Vital Signs











Temp  39.9 C H  05/18/18 13:41


 


Pulse  164 H  05/18/18 14:24


 


Resp  27 H  05/18/18 14:24


 


BP  118/73   05/18/18 14:24


 


Pulse Ox  89 L  05/18/18 14:24














- Orders/Labs/Meds


Orders: 


 Active Orders 24 hr











 Category Date Time Status


 


 Cardiac Monitoring [RC] .As Directed Care  05/18/18 14:24 Active


 


 EKG Documentation Completion [RC] ASDIRECTED Care  05/18/18 13:52 Active


 


 Oxygen Therapy Adult [Oxygen Therapy, ED] [RC] Care  05/18/18 14:25 Active





 ASDIRECTED   


 


 Vital Signs [RC] Q1H Care  05/18/18 13:57 Active


 


 Chest 2V [CR] Stat Exams  05/18/18 14:26 Taken


 


 CULTURE BLOOD [BC] Urgent Lab  05/18/18 14:00 Received


 


 CULTURE BLOOD [BC] Urgent Lab  05/18/18 14:14 Received


 


 CULTURE URINE [RM] Stat Lab  05/18/18 14:03 Ordered


 


 UA W/MICROSCOPIC [URIN] Urgent Lab  05/18/18 14:03 Ordered


 


 Sodium Chloride 0.9% [Normal Saline] 1,000 ml Med  05/18/18 15:38 Active





 IV .BOLUS   


 


 cefTRIAXone [Rocephin] 2 gm Med  05/18/18 14:00 Active





 Sodium Chloride 0.9% [Normal Saline] 50 ml   





 IV Q8H   


 


 Blood Culture x2 Reflex Set [OM.PC] Urgent Oth  05/18/18 13:57 Ordered


 


 EKG 12 Lead [EK] Stat Ther  05/18/18 13:52 Ordered








 Medication Orders





Ceftriaxone Sodium 2 gm/ (Sodium Chloride)  50 mls @ 100 mls/hr IV Q8H LAUREN


   Last Admin: 05/18/18 14:53  Dose: 100 mls/hr


Sodium Chloride (Normal Saline)  1,000 mls @ 1,000 mls/hr IV .BOLUS ONE


   Stop: 05/18/18 16:37


   Last Admin: 05/18/18 15:43  Dose: 1,000 mls/hr








Labs: 


 Laboratory Tests











  05/18/18 05/18/18 05/18/18 Range/Units





  13:57 14:00 14:00 


 


WBC   3.6 L   (4.5-11.0)  K/uL


 


RBC   3.83 L   (4.30-5.90)  M/uL


 


Hgb   15.5 H   (12.0-15.0)  g/dL


 


Hct   44.2   (40.0-54.0)  %


 


MCV   115 H   (80-98)  fL


 


MCH   41 H   (27-31)  pg


 


MCHC   35   (32-36)  %


 


Plt Count   78 L   (150-400)  K/uL


 


Neut % (Auto)   62   (36-66)  %


 


Lymph % (Auto)   38   (24-44)  %


 


Mono % (Auto)   1 L   (2-6)  %


 


Eos % (Auto)   0 L   (2-4)  %


 


Baso % (Auto)   0   (0-1)  %


 


Puncture Site     


 


ABG pH     (7.350-7.450)  


 


ABG pCO2     (35.0-42.0)  mmHg


 


ABG pO2     (75.0-100.0)  mmHg


 


ABG HCO3     (22.0-26.0)  mmol/L


 


ABG Total CO2     (23.0-27.0)  mmol/L


 


ABG O2 Saturation     (95.0-98.0)  %


 


ABG O2 Content     (15.0-23.0)  %vol


 


ABG Base Excess     mm/L


 


ABG Hemoglobin     (13.5-18.0)  g/dL


 


ABG Oxyhemoglobin     %


 


ABG Carboxyhemoglobin     (0.0-1.6)  %


 


ABG Methemoglobin     %


 


David Test     


 


O2 Delivery Device     


 


Sodium    142  (140-148)  mmol/L


 


Potassium    4.0  (3.6-5.2)  mmol/L


 


Chloride    109 H  (100-108)  mmol/L


 


Carbon Dioxide    19 L  (21-32)  mmol/L


 


Anion Gap    18.0 H  (5.0-14.0)  mmol/L


 


BUN    26 H  (7-18)  mg/dL


 


Creatinine    2.0 H  (0.8-1.3)  mg/dL


 


Est Cr Clr Drug Dosing    40.16  mL/min


 


Estimated GFR (MDRD)    35 L  (>60)  


 


Glucose    129 H  ()  mg/dL


 


Lactic Acid     (0.4-2.0)  mmol/L


 


Calcium    8.5  (8.5-10.1)  mg/dL


 


Total Bilirubin    1.0  (0.2-1.0)  mg/dL


 


AST    54 H  (15-37)  U/L


 


ALT    57  (12-78)  U/L


 


Alkaline Phosphatase    147 H  ()  U/L


 


C-Reactive Protein  4.27 H    (0.0-0.3)  mg/dL


 


Total Protein    6.3 L  (6.4-8.2)  g/dL


 


Albumin    3.6  (3.4-5.0)  g/dL


 


Globulin    2.7  (2.3-3.5)  g/dL


 


Albumin/Globulin Ratio    1.3  (1.2-2.2)  


 


Urine Color     


 


Urine Appearance     


 


Urine pH     (4.5-8.0)  


 


Ur Specific Gravity     (1.008-1.030)  


 


Urine Protein     (NEGATIVE)  mg/dL


 


Urine Glucose (UA)     (NEGATIVE)  mg/dL


 


Urine Ketones     (NEGATIVE)  mg/dL


 


Urine Occult Blood     (NEGATIVE)  


 


Urine Nitrite     (NEGAITVE)  


 


Urine Bilirubin     (NEGATIVE)  


 


Urine Urobilinogen     (NORMAL)  mg/dL


 


Ur Leukocyte Esterase     (NEGATIVE)  


 


Urine RBC     (0-5)  


 


Urine WBC     (0-5)  


 


Ur Epithelial Cells     


 


Amorphous Sediment     


 


Urine Bacteria     


 


Urine Mucus     














  05/18/18 05/18/18 05/18/18 Range/Units





  14:00 14:03 15:24 


 


WBC     (4.5-11.0)  K/uL


 


RBC     (4.30-5.90)  M/uL


 


Hgb     (12.0-15.0)  g/dL


 


Hct     (40.0-54.0)  %


 


MCV     (80-98)  fL


 


MCH     (27-31)  pg


 


MCHC     (32-36)  %


 


Plt Count     (150-400)  K/uL


 


Neut % (Auto)     (36-66)  %


 


Lymph % (Auto)     (24-44)  %


 


Mono % (Auto)     (2-6)  %


 


Eos % (Auto)     (2-4)  %


 


Baso % (Auto)     (0-1)  %


 


Puncture Site    Rt radial  


 


ABG pH    7.398  (7.350-7.450)  


 


ABG pCO2    24.1 L  (35.0-42.0)  mmHg


 


ABG pO2    66.9 L  (75.0-100.0)  mmHg


 


ABG HCO3    14.5 L  (22.0-26.0)  mmol/L


 


ABG Total CO2    12.9 L  (23.0-27.0)  mmol/L


 


ABG O2 Saturation    92.2 L  (95.0-98.0)  %


 


ABG O2 Content    16.7  (15.0-23.0)  %vol


 


ABG Base Excess    -8.3  mm/L


 


ABG Hemoglobin    13.3 L  (13.5-18.0)  g/dL


 


ABG Oxyhemoglobin    89.4  %


 


ABG Carboxyhemoglobin    0.9  (0.0-1.6)  %


 


ABG Methemoglobin    2.1  %


 


David Test    Passed  


 


O2 Delivery Device    Nasal cannula  


 


Sodium     (140-148)  mmol/L


 


Potassium     (3.6-5.2)  mmol/L


 


Chloride     (100-108)  mmol/L


 


Carbon Dioxide     (21-32)  mmol/L


 


Anion Gap     (5.0-14.0)  mmol/L


 


BUN     (7-18)  mg/dL


 


Creatinine     (0.8-1.3)  mg/dL


 


Est Cr Clr Drug Dosing     mL/min


 


Estimated GFR (MDRD)     (>60)  


 


Glucose     ()  mg/dL


 


Lactic Acid  4.0 H    (0.4-2.0)  mmol/L


 


Calcium     (8.5-10.1)  mg/dL


 


Total Bilirubin     (0.2-1.0)  mg/dL


 


AST     (15-37)  U/L


 


ALT     (12-78)  U/L


 


Alkaline Phosphatase     ()  U/L


 


C-Reactive Protein     (0.0-0.3)  mg/dL


 


Total Protein     (6.4-8.2)  g/dL


 


Albumin     (3.4-5.0)  g/dL


 


Globulin     (2.3-3.5)  g/dL


 


Albumin/Globulin Ratio     (1.2-2.2)  


 


Urine Color   Orange   


 


Urine Appearance   Cloudy   


 


Urine pH   5.0   (4.5-8.0)  


 


Ur Specific Gravity   1.010   (1.008-1.030)  


 


Urine Protein   30 H   (NEGATIVE)  mg/dL


 


Urine Glucose (UA)   Normal   (NEGATIVE)  mg/dL


 


Urine Ketones   Negative   (NEGATIVE)  mg/dL


 


Urine Occult Blood   Large   (NEGATIVE)  


 


Urine Nitrite   Negative   (NEGAITVE)  


 


Urine Bilirubin   Negative   (NEGATIVE)  


 


Urine Urobilinogen   Normal   (NORMAL)  mg/dL


 


Ur Leukocyte Esterase   Large   (NEGATIVE)  


 


Urine RBC   20-30 H   (0-5)  


 


Urine WBC   10-20 H   (0-5)  


 


Ur Epithelial Cells   Not seen   


 


Amorphous Sediment   Not seen   


 


Urine Bacteria   Few   


 


Urine Mucus   Not seen   











Meds: 


Medications











Generic Name Dose Route Start Last Admin





  Trade Name Freq  PRN Reason Stop Dose Admin


 


Ceftriaxone Sodium 2 gm/  50 mls @ 100 mls/hr  05/18/18 14:00  05/18/18 14:53





  Sodium Chloride  IV   100 mls/hr





  Q8H LAUREN   Administration





     





     





     





     


 


Sodium Chloride  1,000 mls @ 1,000 mls/hr  05/18/18 15:38  05/18/18 15:43





  Normal Saline  IV  05/18/18 16:37  1,000 mls/hr





  .BOLUS ONE   Administration





     





     





     





     














Discontinued Medications














Generic Name Dose Route Start Last Admin





  Trade Name Freq  PRN Reason Stop Dose Admin


 


Acetaminophen  1,000 mg  05/18/18 14:15  05/18/18 14:52





  Tylenol Extra Strength  PO  05/18/18 14:16  1,000 mg





  ONETIME ONE   Administration





     





     





     





     


 


Adenosine  6 mg  05/18/18 14:19  05/18/18 14:53





  Adenocard  IVPUSH  05/18/18 14:20  6 mg





  NOW ONE   Administration





     





     





     





     


 


Lactated Ringer's  1,000 mls @ 999 mls/hr  05/18/18 14:00  





  Ringers, Lactated  IV   





  ASDIRECTED LAUREN   





     





     





     





     


 


Sodium Chloride  1,000 mls @ 1,000 mls/hr  05/18/18 14:27  05/18/18 14:54





  Normal Saline  IV  05/18/18 15:26  1,000 mls/hr





  .BOLUS ONE   Administration





     





     





     





     














Departure





- Departure


Disposition: Admitted As Inpatient 64


Clinical Impression: 


 Sepsis due to urinary tract infection, Low oxygen saturation








- Discharge Information


Referrals: 


Jacinto Fung MD [Primary Care Provider] - 


Forms:  ED Department Discharge





- My Orders


Last 24 Hours: 


My Active Orders





05/18/18 14:24


Cardiac Monitoring [RC] .As Directed 





05/18/18 14:25


Oxygen Therapy Adult [Oxygen Therapy, ED] [RC] ASDIRECTED 





05/18/18 14:26


Chest 2V [CR] Stat 





05/18/18 15:38


Sodium Chloride 0.9% [Normal Saline] 1,000 ml IV .BOLUS 














- Assessment/Plan


Last 24 Hours: 


My Active Orders





05/18/18 14:24


Cardiac Monitoring [RC] .As Directed 





05/18/18 14:25


Oxygen Therapy Adult [Oxygen Therapy, ED] [RC] ASDIRECTED 





05/18/18 14:26


Chest 2V [CR] Stat 





05/18/18 15:38


Sodium Chloride 0.9% [Normal Saline] 1,000 ml IV .BOLUS 














<Cody Jade G - Last Filed: 05/18/18 15:50>





EKG INTERPRETATION


EKG Date: 05/18/18


Time: 14:10


Rhythm: Other (SVT)


Rate (Beats/Min): 169


Axis: Normal


P-Wave: Present


QRS: Normal


ST-T: Normal


QT: Normal


Comparison: NA - No Prior EKG





Course





- Vital Signs


Text/Narrative:: 





No rhythm change with Adenocard 6 mg IV, rate slowed to about 150 from 158. 





Fluid bolus given IV, rate gradually coming down





- Radiology Interpretation


Free Text/Narrative:: 





CXR-no pneumonia, ? callus on posterior R 6th rib 





Departure





- Departure


Time of Disposition: 15:49


Condition: Serious





- My Orders


Last 24 Hours: 


My Active Orders





05/18/18 14:24


Cardiac Monitoring [RC] .As Directed 





05/18/18 14:25


Oxygen Therapy Adult [Oxygen Therapy, ED] [RC] ASDIRECTED 





05/18/18 14:26


Chest 2V [CR] Stat 





05/18/18 15:38


Sodium Chloride 0.9% [Normal Saline] 1,000 ml IV .BOLUS 














- Assessment/Plan


Last 24 Hours: 


My Active Orders





05/18/18 14:24


Cardiac Monitoring [RC] .As Directed 





05/18/18 14:25


Oxygen Therapy Adult [Oxygen Therapy, ED] [RC] ASDIRECTED 





05/18/18 14:26


Chest 2V [CR] Stat 





05/18/18 15:38


Sodium Chloride 0.9% [Normal Saline] 1,000 ml IV .BOLUS

## 2018-05-19 RX ADMIN — DIBASIC SODIUM PHOSPHATE, MONOBASIC POTASSIUM PHOSPHATE AND MONOBASIC SODIUM PHOSPHATE SCH MG: 852; 155; 130 TABLET ORAL at 21:03

## 2018-05-19 RX ADMIN — Medication SCH EACH: at 13:45

## 2018-05-19 RX ADMIN — Medication SCH: at 09:46

## 2018-05-19 RX ADMIN — Medication SCH EACH: at 18:20

## 2018-05-19 RX ADMIN — Medication SCH MG: at 21:06

## 2018-05-19 RX ADMIN — Medication SCH MG: at 09:45

## 2018-05-19 RX ADMIN — Medication SCH EACH: at 21:05

## 2018-05-19 RX ADMIN — Medication SCH EACH: at 21:04

## 2018-05-19 NOTE — PCM.PN
- General Info


Date of Service: 05/19/18


Subjective Update: 





This patient has improved since admission yesterday, no significant temperature 

elevation since last night. Heart rate has come down but still remains mildly 

elevated. He did develop an episode of mood overload last night but responded 

to IV Lasix. Lactic acid level has improved but not yet normalized. White blood 

cell count has come up from admission but remains within normal range.


Functional Status: Reports: Tolerating Diet





- Review of Systems


General: Reports: Fever, Weakness, Chills


Pulmonary: Reports: No Symptoms


Cardiovascular: Reports: No Symptoms


Gastrointestinal: Reports: No Symptoms


Genitourinary: Reports: Other (Chronic indwelling catheter)





- Patient Data


Vitals - Most Recent: 


 Last Vital Signs











Temp  99.2 F   05/19/18 07:34


 


Pulse  114 H  05/19/18 08:56


 


Resp  24 H  05/19/18 08:56


 


BP  115/86   05/19/18 08:56


 


Pulse Ox  94 L  05/19/18 08:56











Weight - Most Recent: 158 lb


I&O - Last 24 Hours: 


 Intake & Output











 05/18/18 05/19/18 05/19/18





 22:59 06:59 14:59


 


Intake Total  2219 


 


Output Total 275 1750 


 


Balance -275 469 











Lab Results Last 24 Hours: 


 Laboratory Results - last 24 hr











  05/18/18 05/18/18 05/18/18 Range/Units





  13:57 14:00 14:00 


 


WBC   3.6 L   (4.5-11.0)  K/uL


 


RBC   3.83 L   (4.30-5.90)  M/uL


 


Hgb   15.5 H   (12.0-15.0)  g/dL


 


Hct   44.2   (40.0-54.0)  %


 


MCV   115 H   (80-98)  fL


 


MCH   41 H   (27-31)  pg


 


MCHC   35   (32-36)  %


 


Plt Count   78 L   (150-400)  K/uL


 


Neut % (Auto)   62   (36-66)  %


 


Lymph % (Auto)   38   (24-44)  %


 


Mono % (Auto)   1 L   (2-6)  %


 


Eos % (Auto)   0 L   (2-4)  %


 


Baso % (Auto)   0   (0-1)  %


 


Add Manual Diff     


 


Neutrophils % (Manual)     (36-66)  %


 


Band Neutrophils %     (5-11)  %


 


Lymphocytes % (Manual)     (24-44)  %


 


Monocytes % (Manual)     (2-6)  %


 


Puncture Site     


 


ABG pH     (7.350-7.450)  


 


ABG pCO2     (35.0-42.0)  mmHg


 


ABG pO2     (75.0-100.0)  mmHg


 


ABG HCO3     (22.0-26.0)  mmol/L


 


ABG Total CO2     (23.0-27.0)  mmol/L


 


ABG O2 Saturation     (95.0-98.0)  %


 


ABG O2 Content     (15.0-23.0)  %vol


 


ABG Base Excess     mm/L


 


ABG Hemoglobin     (13.5-18.0)  g/dL


 


ABG Oxyhemoglobin     %


 


ABG Carboxyhemoglobin     (0.0-1.6)  %


 


ABG Methemoglobin     %


 


David Test     


 


O2 Delivery Device     


 


Sodium    142  (140-148)  mmol/L


 


Potassium    4.0  (3.6-5.2)  mmol/L


 


Chloride    109 H  (100-108)  mmol/L


 


Carbon Dioxide    19 L  (21-32)  mmol/L


 


Anion Gap    18.0 H  (5.0-14.0)  mmol/L


 


BUN    26 H  (7-18)  mg/dL


 


Creatinine    2.0 H  (0.8-1.3)  mg/dL


 


Est Cr Clr Drug Dosing    40.16  mL/min


 


Estimated GFR (MDRD)    35 L  (>60)  


 


Glucose    129 H  ()  mg/dL


 


Lactic Acid     (0.4-2.0)  mmol/L


 


Calcium    8.5  (8.5-10.1)  mg/dL


 


Magnesium     (1.8-2.4)  mg/dL


 


Total Bilirubin    1.0  (0.2-1.0)  mg/dL


 


AST    54 H  (15-37)  U/L


 


ALT    57  (12-78)  U/L


 


Alkaline Phosphatase    147 H  ()  U/L


 


C-Reactive Protein  4.27 H    (0.0-0.3)  mg/dL


 


Total Protein    6.3 L  (6.4-8.2)  g/dL


 


Albumin    3.6  (3.4-5.0)  g/dL


 


Globulin    2.7  (2.3-3.5)  g/dL


 


Albumin/Globulin Ratio    1.3  (1.2-2.2)  


 


Urine Color     


 


Urine Appearance     


 


Urine pH     (4.5-8.0)  


 


Ur Specific Gravity     (1.008-1.030)  


 


Urine Protein     (NEGATIVE)  mg/dL


 


Urine Glucose (UA)     (NEGATIVE)  mg/dL


 


Urine Ketones     (NEGATIVE)  mg/dL


 


Urine Occult Blood     (NEGATIVE)  


 


Urine Nitrite     (NEGAITVE)  


 


Urine Bilirubin     (NEGATIVE)  


 


Urine Urobilinogen     (NORMAL)  mg/dL


 


Ur Leukocyte Esterase     (NEGATIVE)  


 


Urine RBC     (0-5)  


 


Urine WBC     (0-5)  


 


Ur Epithelial Cells     


 


Amorphous Sediment     


 


Urine Bacteria     


 


Urine Mucus     














  05/18/18 05/18/18 05/18/18 Range/Units





  14:00 14:03 15:24 


 


WBC     (4.5-11.0)  K/uL


 


RBC     (4.30-5.90)  M/uL


 


Hgb     (12.0-15.0)  g/dL


 


Hct     (40.0-54.0)  %


 


MCV     (80-98)  fL


 


MCH     (27-31)  pg


 


MCHC     (32-36)  %


 


Plt Count     (150-400)  K/uL


 


Neut % (Auto)     (36-66)  %


 


Lymph % (Auto)     (24-44)  %


 


Mono % (Auto)     (2-6)  %


 


Eos % (Auto)     (2-4)  %


 


Baso % (Auto)     (0-1)  %


 


Add Manual Diff     


 


Neutrophils % (Manual)     (36-66)  %


 


Band Neutrophils %     (5-11)  %


 


Lymphocytes % (Manual)     (24-44)  %


 


Monocytes % (Manual)     (2-6)  %


 


Puncture Site    Rt radial  


 


ABG pH    7.398  (7.350-7.450)  


 


ABG pCO2    24.1 L  (35.0-42.0)  mmHg


 


ABG pO2    66.9 L  (75.0-100.0)  mmHg


 


ABG HCO3    14.5 L  (22.0-26.0)  mmol/L


 


ABG Total CO2    12.9 L  (23.0-27.0)  mmol/L


 


ABG O2 Saturation    92.2 L  (95.0-98.0)  %


 


ABG O2 Content    16.7  (15.0-23.0)  %vol


 


ABG Base Excess    -8.3  mm/L


 


ABG Hemoglobin    13.3 L  (13.5-18.0)  g/dL


 


ABG Oxyhemoglobin    89.4  %


 


ABG Carboxyhemoglobin    0.9  (0.0-1.6)  %


 


ABG Methemoglobin    2.1  %


 


David Test    Passed  


 


O2 Delivery Device    Nasal cannula  


 


Sodium     (140-148)  mmol/L


 


Potassium     (3.6-5.2)  mmol/L


 


Chloride     (100-108)  mmol/L


 


Carbon Dioxide     (21-32)  mmol/L


 


Anion Gap     (5.0-14.0)  mmol/L


 


BUN     (7-18)  mg/dL


 


Creatinine     (0.8-1.3)  mg/dL


 


Est Cr Clr Drug Dosing     mL/min


 


Estimated GFR (MDRD)     (>60)  


 


Glucose     ()  mg/dL


 


Lactic Acid  4.0 H    (0.4-2.0)  mmol/L


 


Calcium     (8.5-10.1)  mg/dL


 


Magnesium     (1.8-2.4)  mg/dL


 


Total Bilirubin     (0.2-1.0)  mg/dL


 


AST     (15-37)  U/L


 


ALT     (12-78)  U/L


 


Alkaline Phosphatase     ()  U/L


 


C-Reactive Protein     (0.0-0.3)  mg/dL


 


Total Protein     (6.4-8.2)  g/dL


 


Albumin     (3.4-5.0)  g/dL


 


Globulin     (2.3-3.5)  g/dL


 


Albumin/Globulin Ratio     (1.2-2.2)  


 


Urine Color   Orange   


 


Urine Appearance   Cloudy   


 


Urine pH   5.0   (4.5-8.0)  


 


Ur Specific Gravity   1.010   (1.008-1.030)  


 


Urine Protein   30 H   (NEGATIVE)  mg/dL


 


Urine Glucose (UA)   Normal   (NEGATIVE)  mg/dL


 


Urine Ketones   Negative   (NEGATIVE)  mg/dL


 


Urine Occult Blood   Large   (NEGATIVE)  


 


Urine Nitrite   Negative   (NEGAITVE)  


 


Urine Bilirubin   Negative   (NEGATIVE)  


 


Urine Urobilinogen   Normal   (NORMAL)  mg/dL


 


Ur Leukocyte Esterase   Large   (NEGATIVE)  


 


Urine RBC   20-30 H   (0-5)  


 


Urine WBC   10-20 H   (0-5)  


 


Ur Epithelial Cells   Not seen   


 


Amorphous Sediment   Not seen   


 


Urine Bacteria   Few   


 


Urine Mucus   Not seen   














  05/18/18 05/19/18 05/19/18 Range/Units





  19:05 05:40 05:40 


 


WBC    10.3  (4.5-11.0)  K/uL


 


RBC    3.20 L  (4.30-5.90)  M/uL


 


Hgb    13.1  D  (12.0-15.0)  g/dL


 


Hct    37.8 L  (40.0-54.0)  %


 


MCV    118 H  (80-98)  fL


 


MCH    41 H  (27-31)  pg


 


MCHC    35  (32-36)  %


 


Plt Count    60 L  (150-400)  K/uL


 


Neut % (Auto)     (36-66)  %


 


Lymph % (Auto)     (24-44)  %


 


Mono % (Auto)     (2-6)  %


 


Eos % (Auto)     (2-4)  %


 


Baso % (Auto)     (0-1)  %


 


Add Manual Diff    Yes  


 


Neutrophils % (Manual)    68 H  (36-66)  %


 


Band Neutrophils %    25 H  (5-11)  %


 


Lymphocytes % (Manual)    3 L  (24-44)  %


 


Monocytes % (Manual)    4  (2-6)  %


 


Puncture Site     


 


ABG pH     (7.350-7.450)  


 


ABG pCO2     (35.0-42.0)  mmHg


 


ABG pO2     (75.0-100.0)  mmHg


 


ABG HCO3     (22.0-26.0)  mmol/L


 


ABG Total CO2     (23.0-27.0)  mmol/L


 


ABG O2 Saturation     (95.0-98.0)  %


 


ABG O2 Content     (15.0-23.0)  %vol


 


ABG Base Excess     mm/L


 


ABG Hemoglobin     (13.5-18.0)  g/dL


 


ABG Oxyhemoglobin     %


 


ABG Carboxyhemoglobin     (0.0-1.6)  %


 


ABG Methemoglobin     %


 


David Test     


 


O2 Delivery Device     


 


Sodium     (140-148)  mmol/L


 


Potassium     (3.6-5.2)  mmol/L


 


Chloride     (100-108)  mmol/L


 


Carbon Dioxide     (21-32)  mmol/L


 


Anion Gap     (5.0-14.0)  mmol/L


 


BUN     (7-18)  mg/dL


 


Creatinine     (0.8-1.3)  mg/dL


 


Est Cr Clr Drug Dosing     mL/min


 


Estimated GFR (MDRD)     (>60)  


 


Glucose     ()  mg/dL


 


Lactic Acid  3.6 H  2.9 H   (0.4-2.0)  mmol/L


 


Calcium     (8.5-10.1)  mg/dL


 


Magnesium     (1.8-2.4)  mg/dL


 


Total Bilirubin     (0.2-1.0)  mg/dL


 


AST     (15-37)  U/L


 


ALT     (12-78)  U/L


 


Alkaline Phosphatase     ()  U/L


 


C-Reactive Protein     (0.0-0.3)  mg/dL


 


Total Protein     (6.4-8.2)  g/dL


 


Albumin     (3.4-5.0)  g/dL


 


Globulin     (2.3-3.5)  g/dL


 


Albumin/Globulin Ratio     (1.2-2.2)  


 


Urine Color     


 


Urine Appearance     


 


Urine pH     (4.5-8.0)  


 


Ur Specific Gravity     (1.008-1.030)  


 


Urine Protein     (NEGATIVE)  mg/dL


 


Urine Glucose (UA)     (NEGATIVE)  mg/dL


 


Urine Ketones     (NEGATIVE)  mg/dL


 


Urine Occult Blood     (NEGATIVE)  


 


Urine Nitrite     (NEGAITVE)  


 


Urine Bilirubin     (NEGATIVE)  


 


Urine Urobilinogen     (NORMAL)  mg/dL


 


Ur Leukocyte Esterase     (NEGATIVE)  


 


Urine RBC     (0-5)  


 


Urine WBC     (0-5)  


 


Ur Epithelial Cells     


 


Amorphous Sediment     


 


Urine Bacteria     


 


Urine Mucus     














  05/19/18 Range/Units





  05:40 


 


WBC   (4.5-11.0)  K/uL


 


RBC   (4.30-5.90)  M/uL


 


Hgb   (12.0-15.0)  g/dL


 


Hct   (40.0-54.0)  %


 


MCV   (80-98)  fL


 


MCH   (27-31)  pg


 


MCHC   (32-36)  %


 


Plt Count   (150-400)  K/uL


 


Neut % (Auto)   (36-66)  %


 


Lymph % (Auto)   (24-44)  %


 


Mono % (Auto)   (2-6)  %


 


Eos % (Auto)   (2-4)  %


 


Baso % (Auto)   (0-1)  %


 


Add Manual Diff   


 


Neutrophils % (Manual)   (36-66)  %


 


Band Neutrophils %   (5-11)  %


 


Lymphocytes % (Manual)   (24-44)  %


 


Monocytes % (Manual)   (2-6)  %


 


Puncture Site   


 


ABG pH   (7.350-7.450)  


 


ABG pCO2   (35.0-42.0)  mmHg


 


ABG pO2   (75.0-100.0)  mmHg


 


ABG HCO3   (22.0-26.0)  mmol/L


 


ABG Total CO2   (23.0-27.0)  mmol/L


 


ABG O2 Saturation   (95.0-98.0)  %


 


ABG O2 Content   (15.0-23.0)  %vol


 


ABG Base Excess   mm/L


 


ABG Hemoglobin   (13.5-18.0)  g/dL


 


ABG Oxyhemoglobin   %


 


ABG Carboxyhemoglobin   (0.0-1.6)  %


 


ABG Methemoglobin   %


 


David Test   


 


O2 Delivery Device   


 


Sodium  143  (140-148)  mmol/L


 


Potassium  3.9  (3.6-5.2)  mmol/L


 


Chloride  110 H  (100-108)  mmol/L


 


Carbon Dioxide  19 L  (21-32)  mmol/L


 


Anion Gap  17.9 H  (5.0-14.0)  mmol/L


 


BUN  27 H  (7-18)  mg/dL


 


Creatinine  2.1 H  (0.8-1.3)  mg/dL


 


Est Cr Clr Drug Dosing  38.45  mL/min


 


Estimated GFR (MDRD)  33 L  (>60)  


 


Glucose  93  ()  mg/dL


 


Lactic Acid   (0.4-2.0)  mmol/L


 


Calcium  7.4 L  (8.5-10.1)  mg/dL


 


Magnesium  1.7 L  (1.8-2.4)  mg/dL


 


Total Bilirubin  1.2 H  (0.2-1.0)  mg/dL


 


AST  144 H D  (15-37)  U/L


 


ALT  67  (12-78)  U/L


 


Alkaline Phosphatase  91  ()  U/L


 


C-Reactive Protein   (0.0-0.3)  mg/dL


 


Total Protein  5.2 L  (6.4-8.2)  g/dL


 


Albumin  2.7 L  (3.4-5.0)  g/dL


 


Globulin  2.5  (2.3-3.5)  g/dL


 


Albumin/Globulin Ratio  1.1 L  (1.2-2.2)  


 


Urine Color   


 


Urine Appearance   


 


Urine pH   (4.5-8.0)  


 


Ur Specific Gravity   (1.008-1.030)  


 


Urine Protein   (NEGATIVE)  mg/dL


 


Urine Glucose (UA)   (NEGATIVE)  mg/dL


 


Urine Ketones   (NEGATIVE)  mg/dL


 


Urine Occult Blood   (NEGATIVE)  


 


Urine Nitrite   (NEGAITVE)  


 


Urine Bilirubin   (NEGATIVE)  


 


Urine Urobilinogen   (NORMAL)  mg/dL


 


Ur Leukocyte Esterase   (NEGATIVE)  


 


Urine RBC   (0-5)  


 


Urine WBC   (0-5)  


 


Ur Epithelial Cells   


 


Amorphous Sediment   


 


Urine Bacteria   


 


Urine Mucus   











[facility] Results Last 24 Hours: 


 Microbiology











 05/18/18 14:14 Aerobic Blood Culture - Preliminary





 Blood - Arm, Left Anaerobic Blood Culture - Preliminary


 


 05/18/18 14:00 Aerobic Blood Culture - Preliminary





 Blood - Arm, Left Anaerobic Blood Culture - Preliminary











Med Orders - Current: 


 Current Medications





Acetaminophen (Tylenol)  650 mg PO Q4H PRN


   PRN Reason: Pain (Mild 1-3)/fever


   Last Admin: 05/18/18 23:34 Dose:  650 mg


Albuterol (Proventil Neb Soln)  2.5 mg NEB Q4H PRN


   PRN Reason: Shortness Of Breath/wheezing


   Last Admin: 05/18/18 23:34 Dose:  2.5 mg


Aspirin (Halfprin)  81 mg PO DAILY [facility]


   Last Admin: 05/19/18 08:25 Dose:  81 mg


Baclofen (Lioresal)  10 mg PO BID [facility]


   Last Admin: 05/19/18 08:24 Dose:  10 mg


Dapsone (Dapsone)  100 mg PO DAILY [facility]


Diphenoxylate HCl/Atropine (Lomotil 0.025-2.5 Mg)  1 - 2 tab PO TID PRN


   PRN Reason: Diarrhea


Enoxaparin Sodium (Lovenox)  40 mg SUBCUT DAILY@2000 [facility]


   Last Admin: 05/18/18 21:17 Dose:  40 mg


Finasteride (Proscar)  5 mg PO DAILY [facility]


   Last Admin: 05/19/18 09:20 Dose:  5 mg


Meropenem 1 gm/ Sodium (Chloride)  100 mls @ 200 mls/hr IV Q8H [facility]


   Last Admin: 05/19/18 04:17 Dose:  200 mls/hr


Vancomycin HCl 1 gm/ Sodium (Chloride)  250 mls @ 166.667 mls/hr IV Q12H [facility]


   Last Admin: 05/19/18 09:18 Dose:  166.667 mls/hr


Magnesium Sulfate 2 gm/ Premix  50 mls @ 25 mls/hr IV ONETIME ONE


   Stop: 05/19/18 10:59


   Last Admin: 05/19/18 08:31 Dose:  25 mls/hr


Lactated Ringer's (Ringers, Lactated)  1,000 mls @ 50 mls/hr IV ASDIRECTED [facility]


Levothyroxine Sodium 100 mcg/ (Levothyroxine Sodium 75 mcg)  175 mcg PO 

ACBREAKFAST [facility]


   Last Admin: 05/19/18 08:14 Dose:  175 mcg


Magnesium Hydroxide (Milk Of Magnesia)  30 ml PO Q12H PRN


   PRN Reason: Constipation


Efavirenz [Sustiva] (600mg)  600 mg PO BEDTIME [facility]


Lamivudine/Zidovudine [Combivir 150-300 Mg] 1 Tab  1 tab PO BID [facility]


Potassium Citrate [ Potassium Citrate Er ] 1 Tab  1 tab PO BID [facility]


Raltegravir [ (Isentress] 400mg)  400 mg PO BID [facility]


   Last Admin: 05/19/18 09:46 Dose:  Not Given


Ondansetron HCl (Zofran)  4 mg IV Q4H PRN


   PRN Reason: Nausea/Vomiting


Oxycodone HCl (Oxycodone)  5 mg PO Q6H PRN


   PRN Reason: Pain


Polyethylene Glycol (Miralax)  17 gm PO DAILY PRN


   PRN Reason: Constipation


Sodium Bicarbonate (Sodium Bicarbonate)  1,300 mg PO BID [facility]


   Stop: 05/19/18 10:30


   Last Admin: 05/19/18 09:42 Dose:  1,300 mg


Sodium Bicarbonate (Sodium Bicarbonate)  650 mg PO BID [facility]


Sodium Chloride (Saline Flush)  10 ml FLUSH ASDIRECTED PRN


   PRN Reason: Keep Vein Open


Sodium Phosphate (Neutra-Phos)  250 mg PO BEDTIME [facility]


   Last Admin: 05/18/18 21:51 Dose:  Not Given


Tamsulosin HCl (Flomax)  0.4 mg PO DAILY [facility]


   Last Admin: 05/19/18 08:25 Dose:  0.4 mg


Trospium (Sanctura)  20 mg PO BID [facility]


   Last Admin: 05/19/18 08:24 Dose:  20 mg





Discontinued Medications





Acetaminophen (Tylenol Extra Strength)  1,000 mg PO ONETIME ONE


   Stop: 05/18/18 14:16


   Last Admin: 05/18/18 14:52 Dose:  1,000 mg


Adenosine (Adenocard)  6 mg IVPUSH NOW ONE


   Stop: 05/18/18 14:20


   Last Admin: 05/18/18 14:53 Dose:  6 mg


Furosemide (Lasix)  40 mg IVPUSH ONETIME ONE


   Stop: 05/18/18 23:49


   Last Admin: 05/18/18 23:58 Dose:  40 mg


Ceftriaxone Sodium 2 gm/ (Sodium Chloride)  50 mls @ 100 mls/hr IV Q8H [facility]


   Last Admin: 05/18/18 14:53 Dose:  100 mls/hr


Lactated Ringer's (Ringers, Lactated)  1,000 mls @ 999 mls/hr IV ASDIRECTED [facility]


Sodium Chloride (Normal Saline)  1,000 mls @ 1,000 mls/hr IV .BOLUS ONE


   Stop: 05/18/18 15:26


   Last Admin: 05/18/18 14:54 Dose:  1,000 mls/hr


Sodium Chloride (Normal Saline)  1,000 mls @ 1,000 mls/hr IV .BOLUS ONE


   Stop: 05/18/18 16:37


   Last Admin: 05/18/18 15:43 Dose:  1,000 mls/hr


Lactated Ringer's (Ringers, Lactated)  1,000 mls @ 500 mls/hr IV ASDIRECTED [facility]


   Stop: 05/18/18 21:28


   Last Admin: 05/18/18 19:24 Dose:  500 mls/hr


Lactated Ringer's (Ringers, Lactated)  1,000 mls @ 125 mls/hr IV ASDIRECTED [facility]


   Last Admin: 05/19/18 07:29 Dose:  125 mls/hr


Vancomycin HCl 1 gm/ Sodium (Chloride)  250 mls @ 166.667 mls/hr IV Q12H [facility]


   Last Admin: 05/18/18 19:53 Dose:  166.667 mls/hr


Lactated Ringer's (Ringers, Lactated)  1,000 mls @ 500 mls/hr IV ASDIRECTED [facility]


   Stop: 05/19/18 00:01


   Last Admin: 05/18/18 22:14 Dose:  500 mls/hr


Vancomycin HCl (Vancomycin)  1 gm IV .PHARMACY TO DOSE [facility]











- Exam


Quality Assessment: Urine Catheter, DVT Prophylaxis


General: Alert, Oriented, Cooperative, Mild Distress


Lungs: Clear to Auscultation, Normal Respiratory Effort


Cardiovascular: Regular Rate, Regular Rhythm, No Murmurs


GI/Abdominal Exam: Soft, Non-Tender, No Organomegaly, No Distention


Extremities: Non-Tender, Pedal Edema


Skin: Warm, Dry, Intact





- Problem List Review


Problem List Initiated/Reviewed/Updated: Yes





- My Orders


Last 24 Hours: 


My Active Orders





05/18/18 16:18


Resuscitation Status Routine 





05/18/18 18:27


Patient Status [ADT] Routine 


Ambulate [RC] QID 


Cardiac Monitoring [RC] .As Directed 


Height and Weight [RC] DAILY 


Intake and Output [RC] QSHIFT 


Notify Provider Vital Signs [RC] ASDIRECTED 


Oxygen Therapy [RC] PRN 


Peripheral IV Care [RC] .AS DIRECTED 


Pulse Oximetry [RC] CONTINUOUS 


RT Aerosol Therapy [RC] ASDIRECTED 


Up With Assistance [RC] ASDIRECTED 


Up to Chair [RC] QID 


VTE/DVT Education [RC] Per Unit Routine 


Vital Signs [RC] Q1H 


Acetaminophen [Tylenol]   650 mg PO Q4H PRN 


Albuterol [Proventil Neb Soln]   2.5 mg NEB Q4H PRN 


Atropine/Diphenoxylate [Lomotil 0.025-2.5 MG]   1 - 2 tab PO TID PRN 


Magnesium Hydroxide [Milk of Magnesia]   30 ml PO Q12H PRN 


Ondansetron [Zofran]   4 mg IV Q4H PRN 


Polyethylene Glycol 3350 [MiraLAX]   17 gm PO DAILY PRN 


Sodium Chloride 0.9% [Saline Flush]   10 ml FLUSH ASDIRECTED PRN 


oxyCODONE   5 mg PO Q6H PRN 


Peripheral IV Insertion Adult [OM.PC] Routine 





05/18/18 20:00


Enoxaparin [Lovenox]   40 mg SUBCUT DAILY@2000 


Meropenem [Merrem] 1 gm   Sodium Chloride 0.9% [Normal Saline] 100 ml IV Q8H 





05/18/18 21:00


Baclofen [Lioresal]   10 mg PO BID 


Efavirenz [Sustiva]   600 mg PO BEDTIME 


Phosphorus #1 [Neutra-Phos]   250 mg PO BEDTIME 


Potassium Citrate [Potassium Citrate ER]   1 tab PO BID 


Raltegravir [Isentress]   400 mg PO BID 


Sodium Bicarbonate   1,300 mg PO BID 


lamiVUDine/Zidovudine [Combivir 150-300 MG]   1 tab PO BID 





05/18/18 Lunch


Regular Diet [DIET] 





05/19/18 05:11


Chest 2V [CR] AM 





05/19/18 07:30


Levothyroxine [Synthroid]   175 mcg PO ACBREAKFAST 





05/19/18 09:00


Aspirin [Halfprin]   81 mg PO DAILY 


Dapsone   100 mg PO DAILY 


Finasteride [Proscar]   5 mg PO DAILY 


Magnesium Sulfate/Water [Magnesium Sulfate 2 GM in Water 50 ML] 2 gm   Premix 

Bag 1 bag IV ONETIME 


Tamsulosin [Flomax]   0.4 mg PO DAILY 


Trospium [Sanctura]   20 mg PO BID 


Vancomycin 1 gm   Sodium Chloride 0.9% [Normal Saline] 250 ml IV Q12H 





05/19/18 10:15


Lactated Ringers [Ringers, Lactated] 1,000 ml IV ASDIRECTED 





05/19/18 17:00


LACTIC ACID [CHEM] Stat 





05/19/18 21:00


Sodium Bicarbonate   650 mg PO BID 





05/20/18 05:00


CBC WITH AUTO DIFF [HEME] Timed 


COMPREHENSIVE METABOLIC PN,CMP [CHEM] Timed 


LACTIC ACID [CHEM] Timed 


MAGNESIUM [CHEM] Timed 





05/20/18 08:30


VANCOMYCIN TROUGH [CHEM] Routine 














- Plan


Plan:: 





ASSESSMENT AND PLAN





SEPTIC SHOCK-likely urinary source, improved since admission


-Decrease IV fluids to 50 mL/h


-Blood and urine cultures pending


-Admission to ICU for more close monitoring


-IV meropenem and vancomycin pending culture results


-Follow-up lactic acid level later this evening and in a.m.





HYPOXIA-likely secondary to sepsis, no evidence of underlying pulmonary 

infection, increased hypoxia shortness of breath last night likely related to 

fluid overload. No change in appearance of chest x-ray since admission


-Supplemental oxygen as needed


-Continuous pulse oximetry





SINUS TACHYCARDIA-secondary to septic shock, no change in rhythm with use of 

Adenocard. Rate has slowed with IV hydration.


-Cardiac monitoring





ACUTE ON CHRONIC KIDNEY INJURY-at baseline has chronic kidney disease stage III

, renal function stable since admission


-IV fluids as above


-Closely monitor urine output and renal function





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 Currently in remission with no 

evidence of active disease and no recent chemotherapy or radiation therapy





BLADDER OUTLET OBSTRUCTION SECONDARY TO BPH-indwelling Guzman catheter





HIV-well managed at the present time, by history viral count has been 

undetectable over the past year


-Continue outpatient medical regimen





MAINTENANCE ISSUES


-DVT prophylaxis; Lovenox 40 mg subcutaneous daily


-GI prophylaxis; not indicated


-Guzman catheter; chronic indwelling catheter secondary to bladder outlet 

obstruction


-Nutrition; regular diet


-Nicotine dependence; not indicated





CODE STATUS-FULL CODE





ADMISSION STATUS-patient will be admitted to inpatient status, expect at least 

a 2 night hospital stay for evaluation and management of problems as outlined 

above.  At the time of this admission I do not reasonably expected evaluation 

and management of this problem will require more than a 96 hour hospital stay.





DISPOSITION-anticipate discharge to home after the hospital stay.





PRIMARY CARE PROVIDER-Dr. Fung

## 2018-05-20 RX ADMIN — SODIUM CHLORIDE SCH MLS/HR: 9 INJECTION, SOLUTION INTRAVENOUS at 13:50

## 2018-05-20 RX ADMIN — Medication SCH EACH: at 07:32

## 2018-05-20 RX ADMIN — Medication SCH MG: at 11:08

## 2018-05-20 RX ADMIN — Medication SCH EACH: at 17:43

## 2018-05-20 RX ADMIN — Medication SCH EACH: at 11:09

## 2018-05-20 RX ADMIN — Medication SCH EACH: at 22:13

## 2018-05-20 RX ADMIN — SODIUM CHLORIDE SCH MLS/HR: 9 INJECTION, SOLUTION INTRAVENOUS at 10:56

## 2018-05-20 RX ADMIN — DIBASIC SODIUM PHOSPHATE, MONOBASIC POTASSIUM PHOSPHATE AND MONOBASIC SODIUM PHOSPHATE SCH MG: 852; 155; 130 TABLET ORAL at 22:08

## 2018-05-20 RX ADMIN — Medication SCH EACH: at 22:14

## 2018-05-20 NOTE — PCM.PN
- General Info


Date of Service: 05/20/18


Subjective Update: 





Mr. Garvey has remained stable since yesterday, white blood cell count now 

mildly elevated, he has been afebrile. Energy level and appetite seem to be at 

least moderately improved. Blood pressure has been stable but he is had a 

persistent sinus tachycardia.


Functional Status: Reports: Tolerating Diet, Ambulating, Urinating





- Review of Systems


General: Reports: Weakness.  Denies: Fever, Chills


Pulmonary: Reports: No Symptoms


Cardiovascular: Reports: No Symptoms


Gastrointestinal: Reports: No Symptoms


Genitourinary: Reports: No Symptoms, Other (Indwelling Guzman catheter secondary 

to bladder outlet obstruction)





- Patient Data


Vitals - Most Recent: 


 Last Vital Signs











Temp  99.4 F   05/20/18 07:41


 


Pulse  119 H  05/20/18 07:41


 


Resp  26 H  05/20/18 07:41


 


BP  128/81   05/20/18 07:41


 


Pulse Ox  93 L  05/20/18 07:41











Weight - Most Recent: 158 lb


I&O - Last 24 Hours: 


 Intake & Output











 05/19/18 05/20/18 05/20/18





 22:59 06:59 14:59


 


Intake Total 340 920 


 


Output Total 450 1750 


 


Balance -110 -830 











Lab Results Last 24 Hours: 


 Laboratory Results - last 24 hr











  05/19/18 05/20/18 05/20/18 Range/Units





  16:59 06:00 06:00 


 


WBC   12.4 H   (4.5-11.0)  K/uL


 


RBC   3.32 L   (4.30-5.90)  M/uL


 


Hgb   12.7   (12.0-15.0)  g/dL


 


Hct   39.5 L   (40.0-54.0)  %


 


MCV   119 H   (80-98)  fL


 


MCH   38 H   (27-31)  pg


 


MCHC   32   (32-36)  %


 


Plt Count   45 L   (150-400)  K/uL


 


Neut % (Auto)   Np   


 


Lymph % (Auto)   Np   


 


Mono % (Auto)   Np   


 


Eos % (Auto)   Np   


 


Baso % (Auto)   Np   


 


Add Manual Diff   Yes   


 


Neutrophils % (Manual)   71 H   (36-66)  %


 


Band Neutrophils %   10   (5-11)  %


 


Lymphocytes % (Manual)   14 L   (24-44)  %


 


Monocytes % (Manual)   5   (2-6)  %


 


Sodium    140  (140-148)  mmol/L


 


Potassium    3.2 L  (3.6-5.2)  mmol/L


 


Chloride    109 H  (100-108)  mmol/L


 


Carbon Dioxide    19 L  (21-32)  mmol/L


 


Anion Gap    15.2 H  (5.0-14.0)  mmol/L


 


BUN    22 H  (7-18)  mg/dL


 


Creatinine    1.6 H  (0.8-1.3)  mg/dL


 


Est Cr Clr Drug Dosing    50.47  mL/min


 


Estimated GFR (MDRD)    45 L  (>60)  


 


Glucose    91  ()  mg/dL


 


Lactic Acid  1.7    (0.4-2.0)  mmol/L


 


Calcium    7.4 L  (8.5-10.1)  mg/dL


 


Magnesium    2.0  (1.8-2.4)  mg/dL


 


Total Bilirubin    0.9  (0.2-1.0)  mg/dL


 


AST    236 H  (15-37)  U/L


 


ALT    101 H  (12-78)  U/L


 


Alkaline Phosphatase    111  ()  U/L


 


Total Protein    5.3 L  (6.4-8.2)  g/dL


 


Albumin    2.5 L  (3.4-5.0)  g/dL


 


Globulin    2.8  (2.3-3.5)  g/dL


 


Albumin/Globulin Ratio    0.9 L  (1.2-2.2)  


 


Vancomycin Trough     (10.0-20.0)  ug/mL














  05/20/18 05/20/18 Range/Units





  06:00 08:30 


 


WBC    (4.5-11.0)  K/uL


 


RBC    (4.30-5.90)  M/uL


 


Hgb    (12.0-15.0)  g/dL


 


Hct    (40.0-54.0)  %


 


MCV    (80-98)  fL


 


MCH    (27-31)  pg


 


MCHC    (32-36)  %


 


Plt Count    (150-400)  K/uL


 


Neut % (Auto)    


 


Lymph % (Auto)    


 


Mono % (Auto)    


 


Eos % (Auto)    


 


Baso % (Auto)    


 


Add Manual Diff    


 


Neutrophils % (Manual)    (36-66)  %


 


Band Neutrophils %    (5-11)  %


 


Lymphocytes % (Manual)    (24-44)  %


 


Monocytes % (Manual)    (2-6)  %


 


Sodium    (140-148)  mmol/L


 


Potassium    (3.6-5.2)  mmol/L


 


Chloride    (100-108)  mmol/L


 


Carbon Dioxide    (21-32)  mmol/L


 


Anion Gap    (5.0-14.0)  mmol/L


 


BUN    (7-18)  mg/dL


 


Creatinine    (0.8-1.3)  mg/dL


 


Est Cr Clr Drug Dosing    mL/min


 


Estimated GFR (MDRD)    (>60)  


 


Glucose    ()  mg/dL


 


Lactic Acid  1.5   (0.4-2.0)  mmol/L


 


Calcium    (8.5-10.1)  mg/dL


 


Magnesium    (1.8-2.4)  mg/dL


 


Total Bilirubin    (0.2-1.0)  mg/dL


 


AST    (15-37)  U/L


 


ALT    (12-78)  U/L


 


Alkaline Phosphatase    ()  U/L


 


Total Protein    (6.4-8.2)  g/dL


 


Albumin    (3.4-5.0)  g/dL


 


Globulin    (2.3-3.5)  g/dL


 


Albumin/Globulin Ratio    (1.2-2.2)  


 


Vancomycin Trough   14.2  (10.0-20.0)  ug/mL











Cameron Results Last 24 Hours: 


 Microbiology











 05/18/18 14:14 Aerobic Blood Culture - Preliminary





 Blood - Arm, Left Anaerobic Blood Culture - Preliminary


 


 05/18/18 14:00 Aerobic Blood Culture - Preliminary





 Blood - Arm, Left Anaerobic Blood Culture - Preliminary


 


 05/18/18 14:03 Urine Culture - Preliminary





 Urine, Clean Catch 











Med Orders - Current: 


 Current Medications





Acetaminophen (Tylenol)  650 mg PO Q4H PRN


   PRN Reason: Pain (Mild 1-3)/fever


   Last Admin: 05/19/18 18:56 Dose:  650 mg


Albuterol (Proventil Neb Soln)  2.5 mg NEB Q4H PRN


   PRN Reason: Shortness Of Breath/wheezing


   Last Admin: 05/18/18 23:34 Dose:  2.5 mg


Aspirin (Halfprin)  81 mg PO DAILY [facility]


   Last Admin: 05/19/18 08:25 Dose:  81 mg


Baclofen (Lioresal)  10 mg PO BID [facility]


   Last Admin: 05/19/18 21:03 Dose:  10 mg


Dapsone (Dapsone)  100 mg PO BEDTIME [facility]


   Last Admin: 05/19/18 21:02 Dose:  100 mg


Diphenoxylate HCl/Atropine (Lomotil 0.025-2.5 Mg)  1 - 2 tab PO TID PRN


   PRN Reason: Diarrhea


Finasteride (Proscar)  5 mg PO DAILY [facility]


   Last Admin: 05/19/18 09:20 Dose:  5 mg


Meropenem 1 gm/ Sodium (Chloride)  100 mls @ 200 mls/hr IV Q8H [facility]


   Last Admin: 05/20/18 03:21 Dose:  200 mls/hr


Potassium Chloride 20 meq/Lidocaine HCl 2 ml/ Sodium Chloride  112 mls @ 56 mls/

hr IV Q2H [facility]


   Stop: 05/20/18 12:59


Levothyroxine Sodium 100 mcg/ (Levothyroxine Sodium 75 mcg)  175 mcg PO 

ACBREAKFAST [facility]


   Last Admin: 05/20/18 07:31 Dose:  175 mcg


Magnesium Hydroxide (Milk Of Magnesia)  30 ml PO Q12H PRN


   PRN Reason: Constipation


Raltegravir [ (Isentress] 400mg)  400 mg PO BID [facility]


   Last Admin: 05/19/18 21:06 Dose:  400 mg


Ondansetron HCl (Zofran)  4 mg IV Q4H PRN


   PRN Reason: Nausea/Vomiting


Oxycodone HCl (Oxycodone)  5 mg PO Q6H PRN


   PRN Reason: Pain


Sustiva 600mg (Ptom)  0 each PO BEDTIME [facility]


   Last Admin: 05/19/18 21:05 Dose:  1 each


Potassium Citra Er (15meq Tab (Ptom))  0 each PO BIDMEALS [facility]


   Last Admin: 05/20/18 07:32 Dose:  1 each


Lamivudine/Zidovudine 150/300mg (Ptom)  0 each PO BID [facility]


   Last Admin: 05/19/18 21:04 Dose:  1 each


Polyethylene Glycol (Miralax)  17 gm PO DAILY PRN


   PRN Reason: Constipation


Sodium Bicarbonate (Sodium Bicarbonate)  650 mg PO BID [facility]


   Last Admin: 05/19/18 21:07 Dose:  650 mg


Sodium Chloride (Saline Flush)  10 ml FLUSH ASDIRECTED PRN


   PRN Reason: Keep Vein Open


Sodium Phosphate (Neutra-Phos)  250 mg PO BEDTIME [facility]


   Last Admin: 05/19/18 21:03 Dose:  250 mg


Tamsulosin HCl (Flomax)  0.4 mg PO DAILY [facility]


   Last Admin: 05/19/18 08:25 Dose:  0.4 mg


Trospium (Sanctura)  20 mg PO BID [facility]


   Last Admin: 05/19/18 21:07 Dose:  20 mg





Discontinued Medications





Acetaminophen (Tylenol Extra Strength)  1,000 mg PO ONETIME ONE


   Stop: 05/18/18 14:16


   Last Admin: 05/18/18 14:52 Dose:  1,000 mg


Adenosine (Adenocard)  6 mg IVPUSH NOW ONE


   Stop: 05/18/18 14:20


   Last Admin: 05/18/18 14:53 Dose:  6 mg


Dapsone (Dapsone)  100 mg PO DAILY [facility]


   Last Admin: 05/19/18 13:08 Dose:  Not Given


Enoxaparin Sodium (Lovenox)  40 mg SUBCUT DAILY@2000 [facility]


   Last Admin: 05/18/18 21:17 Dose:  40 mg


Furosemide (Lasix)  40 mg IVPUSH ONETIME ONE


   Stop: 05/18/18 23:49


   Last Admin: 05/18/18 23:58 Dose:  40 mg


Ceftriaxone Sodium 2 gm/ (Sodium Chloride)  50 mls @ 100 mls/hr IV Q8H [facility]


   Last Admin: 05/18/18 14:53 Dose:  100 mls/hr


Lactated Ringer's (Ringers, Lactated)  1,000 mls @ 999 mls/hr IV ASDIRECTED [facility]


Sodium Chloride (Normal Saline)  1,000 mls @ 1,000 mls/hr IV .BOLUS ONE


   Stop: 05/18/18 15:26


   Last Admin: 05/18/18 14:54 Dose:  1,000 mls/hr


Sodium Chloride (Normal Saline)  1,000 mls @ 1,000 mls/hr IV .BOLUS ONE


   Stop: 05/18/18 16:37


   Last Admin: 05/18/18 15:43 Dose:  1,000 mls/hr


Lactated Ringer's (Ringers, Lactated)  1,000 mls @ 500 mls/hr IV ASDIRECTED [facility]


   Stop: 05/18/18 21:28


   Last Admin: 05/18/18 19:24 Dose:  500 mls/hr


Lactated Ringer's (Ringers, Lactated)  1,000 mls @ 125 mls/hr IV ASDIRECTED [facility]


   Last Admin: 05/19/18 07:29 Dose:  125 mls/hr


Vancomycin HCl 1 gm/ Sodium (Chloride)  250 mls @ 166.667 mls/hr IV Q12H [facility]


   Last Admin: 05/18/18 19:53 Dose:  166.667 mls/hr


Lactated Ringer's (Ringers, Lactated)  1,000 mls @ 500 mls/hr IV ASDIRECTED [facility]


   Stop: 05/19/18 00:01


   Last Admin: 05/18/18 22:14 Dose:  500 mls/hr


Vancomycin HCl 1 gm/ Sodium (Chloride)  250 mls @ 166.667 mls/hr IV Q12H [facility]


   Last Admin: 05/19/18 21:01 Dose:  166.667 mls/hr


Magnesium Sulfate 2 gm/ Premix  50 mls @ 25 mls/hr IV ONETIME ONE


   Stop: 05/19/18 10:59


   Last Admin: 05/19/18 08:31 Dose:  25 mls/hr


Lactated Ringer's (Ringers, Lactated)  1,000 mls @ 50 mls/hr IV ASDIRECTED [facility]


   Last Admin: 05/19/18 16:59 Dose:  50 mls/hr


Efavirenz [Sustiva] (600mg)  600 mg PO BEDTIME [facility]


   Last Admin: 05/19/18 22:15 Dose:  Not Given


Potassium Chloride (Klor-Con M20)  40 meq PO ONETIME ONE


   Stop: 05/20/18 09:01


Sodium Bicarbonate (Sodium Bicarbonate)  1,300 mg PO BID [facility]


   Stop: 05/19/18 10:30


   Last Admin: 05/19/18 09:42 Dose:  1,300 mg


Vancomycin HCl (Vancomycin)  1 gm IV .PHARMACY TO DOSE [facility]











- Exam


General: Alert, Oriented, Cooperative, Mild Distress


Lungs: Clear to Auscultation, Normal Respiratory Effort


Cardiovascular: Regular Rate, Regular Rhythm, No Murmurs


GI/Abdominal Exam: Soft, Non-Tender, No Organomegaly, No Distention


Extremities: Non-Tender, No Pedal Edema


Skin: Warm, Dry, Intact





- Problem List Review


Problem List Initiated/Reviewed/Updated: Yes





- My Orders


Last 24 Hours: 


My Active Orders





05/19/18 13:30


Patient's Own Medication [Ptom]   0 each PO BID 





05/19/18 17:00


Patient's Own Medication [Ptom]   0 each PO BIDMEALS 





05/19/18 21:00


Dapsone   100 mg PO BEDTIME 


Patient's Own Medication [Ptom]   0 each PO BEDTIME 


Sodium Bicarbonate   650 mg PO BID 





05/20/18 08:30


VANCOMYCIN TROUGH [CHEM] Routine 





05/20/18 09:00


Potassium Chloride 20 meq Lidocaine 1% [Xylocaine 1%] 2 ml   Sodium Chloride 0.9

% [Normal Saline] 100 ml IV Q2H 





05/20/18 10:32


Patient Status [ADT] Routine 





05/20/18 10:34


Convert IV to Saline Lock [OM.PC] Routine 





05/21/18 05:00


CBC WITH AUTO DIFF [HEME] Timed 


COMPREHENSIVE METABOLIC PN,CMP [CHEM] Timed 














- Plan


Plan:: 





ASSESSMENT AND PLAN





SEPTIC SHOCK-likely urinary source, improved since admission. Persistent mild 

sinus tachycardia. Blood cultures as well as urine culture growing predominant 

gram negative rods, final ID and sensitivities pending. Lactic acidosis has 

resolved


-Saline lock IV


-Blood and urine cultures pending


-Continue IV meropenem


-Discontinue IV vancomycin


-Follow-up lactic acid level later this evening and in a.m.





HYPOXIA-resolved





SINUS TACHYCARDIA-secondary to septic shock, mild persistent sinus tachycardia





ACUTE ON CHRONIC KIDNEY INJURY-at baseline has chronic kidney disease stage III

, renal function improved following hydration


-Closely monitor urine output and renal function





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 Currently in remission with no 

evidence of active disease and no recent chemotherapy or radiation therapy





BLADDER OUTLET OBSTRUCTION SECONDARY TO BPH-indwelling Guzman catheter





HIV-well managed at the present time, by history viral count has been 

undetectable over the past year


-Continue outpatient medical regimen





MAINTENANCE ISSUES


-DVT prophylaxis; Lovenox 40 mg subcutaneous daily


-GI prophylaxis; not indicated


-Guzman catheter; chronic indwelling catheter secondary to bladder outlet 

obstruction


-Nutrition; regular diet


-Nicotine dependence; not indicated





CODE STATUS-FULL CODE





ADMISSION STATUS-patient will be admitted to inpatient status, expect at least 

a 2 night hospital stay for evaluation and management of problems as outlined 

above.  At the time of this admission I do not reasonably expected evaluation 

and management of this problem will require more than a 96 hour hospital stay.





DISPOSITION-anticipate discharge to home after the hospital stay.





PRIMARY CARE PROVIDER-Dr. Fung

## 2018-05-21 RX ADMIN — Medication SCH EACH: at 20:17

## 2018-05-21 RX ADMIN — Medication SCH EACH: at 08:06

## 2018-05-21 RX ADMIN — DIBASIC SODIUM PHOSPHATE, MONOBASIC POTASSIUM PHOSPHATE AND MONOBASIC SODIUM PHOSPHATE SCH MG: 852; 155; 130 TABLET ORAL at 20:16

## 2018-05-21 RX ADMIN — RALTEGRAVIR SCH MG: 400 TABLET, FILM COATED ORAL at 20:17

## 2018-05-21 RX ADMIN — Medication SCH EACH: at 20:18

## 2018-05-21 RX ADMIN — Medication SCH EACH: at 08:10

## 2018-05-21 RX ADMIN — Medication SCH EACH: at 16:20

## 2018-05-21 RX ADMIN — Medication SCH: at 20:20

## 2018-05-21 NOTE — CR
Chest 2V

 

INDICATION: Septic shock, follow-up after hydration

 

FINDINGS: Comparison 5/18/2018. Slight prominence of the pulmonary vascularity, exam otherwise unchan
ged. Right Port-A-Cath with tip in the low SVC. Old bilateral rib fractures.

## 2018-05-21 NOTE — CR
Chest 2V

 

INDICATION: hypoxia/fever

 

FINDINGS: Right Port-A-Cath in place with tip in the low SVC. Old bilateral rib fractures. Exam other
wise unremarkable.

## 2018-05-21 NOTE — PCM.PN
- General Info


Date of Service: 05/21/18


Functional Status: Reports: Pain Controlled, Tolerating Diet





- Review of Systems


General: Reports: Weakness


Systems Review Comment:: 





There were no acute events overnight. Patient did not have any fevers. No 

significant abdominal pain. He does not feel short of breath. He feels normal 

other than feeling a little weak. Urine culture growing Morganella. Blood 

cultures are growing gram-negative rods with identification pending. Kidney 

function slowly improving. Blood pressure has been stable. Heart rate remains 

mildly elevated but stable.





- Patient Data


Vitals - Most Recent: 


 Last Vital Signs











Temp  36.6 C   05/21/18 10:46


 


Pulse  103 H  05/21/18 10:46


 


Resp  18   05/21/18 10:46


 


BP  97/65   05/21/18 10:46


 


Pulse Ox  91 L  05/21/18 10:46











Weight - Most Recent: 75.568 kg


I&O - Last 24 Hours: 


 Intake & Output











 05/20/18 05/21/18 05/21/18





 22:59 06:59 14:59


 


Intake Total 1110 220 580


 


Output Total 650 2200 1050


 


Balance 460 -1980 -470











Lab Results Last 24 Hours: 


 Laboratory Results - last 24 hr











  05/21/18 05/21/18 Range/Units





  06:26 06:26 


 


WBC  10.7   (4.5-11.0)  K/uL


 


RBC  3.39 L   (4.30-5.90)  M/uL


 


Hgb  13.2   (12.0-15.0)  g/dL


 


Hct  39.8 L   (40.0-54.0)  %


 


MCV  117 H   (80-98)  fL


 


MCH  39 H   (27-31)  pg


 


MCHC  33   (32-36)  %


 


Plt Count  53 L   (150-400)  K/uL


 


Add Manual Diff  Yes   


 


Neutrophils % (Manual)  83 H   (36-66)  %


 


Lymphocytes % (Manual)  12 L   (24-44)  %


 


Monocytes % (Manual)  4   (2-6)  %


 


Eosinophils % (Manual)  1 L   (2-4)  %


 


Sodium   139 L  (140-148)  mmol/L


 


Potassium   4.1  (3.6-5.2)  mmol/L


 


Chloride   109 H  (100-108)  mmol/L


 


Carbon Dioxide   19 L  (21-32)  mmol/L


 


Anion Gap   15.1 H  (5.0-14.0)  mmol/L


 


BUN   19 H  (7-18)  mg/dL


 


Creatinine   1.5 H  (0.8-1.3)  mg/dL


 


Est Cr Clr Drug Dosing   53.83  mL/min


 


Estimated GFR (MDRD)   49 L  (>60)  


 


Glucose   107 H  ()  mg/dL


 


Calcium   8.1 L  (8.5-10.1)  mg/dL


 


Total Bilirubin   0.8  (0.2-1.0)  mg/dL


 


AST   156 H  (15-37)  U/L


 


ALT   102 H  (12-78)  U/L


 


Alkaline Phosphatase   168 H  ()  U/L


 


Total Protein   5.4 L  (6.4-8.2)  g/dL


 


Albumin   2.5 L  (3.4-5.0)  g/dL


 


Globulin   2.9  (2.3-3.5)  g/dL


 


Albumin/Globulin Ratio   0.9 L  (1.2-2.2)  











Cameron Results Last 24 Hours: 


 Microbiology











 05/18/18 14:03 Urine Culture - Final





 Urine, Clean Catch    Morganella Species


 


 05/18/18 14:14 Aerobic Blood Culture - Preliminary





 Blood - Arm, Left Anaerobic Blood Culture - Preliminary











Med Orders - Current: 


 Current Medications





Acetaminophen (Tylenol)  650 mg PO Q4H PRN


   PRN Reason: Pain (Mild 1-3)/fever


   Last Admin: 05/21/18 08:03 Dose:  650 mg


Albuterol (Proventil Neb Soln)  2.5 mg NEB Q4H PRN


   PRN Reason: Shortness Of Breath/wheezing


   Last Admin: 05/18/18 23:34 Dose:  2.5 mg


Amlodipine Besylate (Norvasc)  2.5 mg PO BEDTIME [facility]


Aspirin (Halfprin)  81 mg PO DAILY [facility]


   Last Admin: 05/21/18 08:08 Dose:  81 mg


Baclofen (Lioresal)  10 mg PO BID [facility]


   Last Admin: 05/21/18 08:08 Dose:  10 mg


Dapsone (Dapsone)  100 mg PO BEDTIME [facility]


   Last Admin: 05/20/18 22:12 Dose:  100 mg


Diphenoxylate HCl/Atropine (Lomotil 0.025-2.5 Mg)  1 - 2 tab PO TID PRN


   PRN Reason: Diarrhea


Finasteride (Proscar)  5 mg PO DAILY [facility]


   Last Admin: 05/21/18 08:09 Dose:  5 mg


Meropenem 1 gm/ Sodium (Chloride)  100 mls @ 200 mls/hr IV Q8H [facility]


   Last Admin: 05/21/18 11:53 Dose:  200 mls/hr


Levothyroxine Sodium 100 mcg/ (Levothyroxine Sodium 75 mcg)  175 mcg PO 

ACBREAKFAST [facility]


   Last Admin: 05/21/18 08:04 Dose:  175 mcg


Magnesium Hydroxide (Milk Of Magnesia)  30 ml PO Q12H PRN


   PRN Reason: Constipation


Ondansetron HCl (Zofran)  4 mg IV Q4H PRN


   PRN Reason: Nausea/Vomiting


Oxycodone HCl (Oxycodone)  5 mg PO Q6H PRN


   PRN Reason: Pain


Sustiva 600mg (Ptom)  0 each PO BEDTIME [facility]


   Last Admin: 05/20/18 22:13 Dose:  1 each


Potassium Citra Er (15meq Tab (Ptom))  0 each PO BIDMEALS [facility]


   Last Admin: 05/21/18 08:06 Dose:  1 each


Lamivudine/Zidovudine 150/300mg (Ptom)  0 each PO BID [facility]


   Last Admin: 05/21/18 08:10 Dose:  1 each


Polyethylene Glycol (Miralax)  17 gm PO DAILY PRN


   PRN Reason: Constipation


Raltegravir (Isentress)  400 mg PO BID [facility]


Sodium Bicarbonate (Sodium Bicarbonate)  650 mg PO BID [facility]


   Last Admin: 05/21/18 08:11 Dose:  650 mg


Sodium Chloride (Saline Flush)  10 ml FLUSH ASDIRECTED PRN


   PRN Reason: Keep Vein Open


Sodium Phosphate (Neutra-Phos)  250 mg PO BEDTIME [facility]


   Last Admin: 05/20/18 22:08 Dose:  250 mg


Tamsulosin HCl (Flomax)  0.4 mg PO DAILY [facility]


   Last Admin: 05/21/18 08:07 Dose:  0.4 mg


Trospium (Sanctura)  20 mg PO BID [facility]


   Last Admin: 05/21/18 08:11 Dose:  20 mg





Discontinued Medications





Acetaminophen (Tylenol Extra Strength)  1,000 mg PO ONETIME ONE


   Stop: 05/18/18 14:16


   Last Admin: 05/18/18 14:52 Dose:  1,000 mg


Adenosine (Adenocard)  6 mg IVPUSH NOW ONE


   Stop: 05/18/18 14:20


   Last Admin: 05/18/18 14:53 Dose:  6 mg


Dapsone (Dapsone)  100 mg PO DAILY [facility]


   Last Admin: 05/19/18 13:08 Dose:  Not Given


Enoxaparin Sodium (Lovenox)  40 mg SUBCUT DAILY@2000 [facility]


   Last Admin: 05/18/18 21:17 Dose:  40 mg


Furosemide (Lasix)  40 mg IVPUSH ONETIME ONE


   Stop: 05/18/18 23:49


   Last Admin: 05/18/18 23:58 Dose:  40 mg


Ceftriaxone Sodium 2 gm/ (Sodium Chloride)  50 mls @ 100 mls/hr IV Q8H [facility]


   Last Admin: 05/18/18 14:53 Dose:  100 mls/hr


Lactated Ringer's (Ringers, Lactated)  1,000 mls @ 999 mls/hr IV ASDIRECTED [facility]


Sodium Chloride (Normal Saline)  1,000 mls @ 1,000 mls/hr IV .BOLUS ONE


   Stop: 05/18/18 15:26


   Last Admin: 05/18/18 14:54 Dose:  1,000 mls/hr


Sodium Chloride (Normal Saline)  1,000 mls @ 1,000 mls/hr IV .BOLUS ONE


   Stop: 05/18/18 16:37


   Last Admin: 05/18/18 15:43 Dose:  1,000 mls/hr


Lactated Ringer's (Ringers, Lactated)  1,000 mls @ 500 mls/hr IV ASDIRECTED [facility]


   Stop: 05/18/18 21:28


   Last Admin: 05/18/18 19:24 Dose:  500 mls/hr


Lactated Ringer's (Ringers, Lactated)  1,000 mls @ 125 mls/hr IV ASDIRECTED [facility]


   Last Admin: 05/19/18 07:29 Dose:  125 mls/hr


Vancomycin HCl 1 gm/ Sodium (Chloride)  250 mls @ 166.667 mls/hr IV Q12H [facility]


   Last Admin: 05/18/18 19:53 Dose:  166.667 mls/hr


Lactated Ringer's (Ringers, Lactated)  1,000 mls @ 500 mls/hr IV ASDIRECTED [facility]


   Stop: 05/19/18 00:01


   Last Admin: 05/18/18 22:14 Dose:  500 mls/hr


Vancomycin HCl 1 gm/ Sodium (Chloride)  250 mls @ 166.667 mls/hr IV Q12H [facility]


   Last Admin: 05/20/18 11:16 Dose:  Not Given


Magnesium Sulfate 2 gm/ Premix  50 mls @ 25 mls/hr IV ONETIME ONE


   Stop: 05/19/18 10:59


   Last Admin: 05/19/18 08:31 Dose:  25 mls/hr


Lactated Ringer's (Ringers, Lactated)  1,000 mls @ 50 mls/hr IV ASDIRECTED [facility]


   Last Admin: 05/19/18 16:59 Dose:  50 mls/hr


Potassium Chloride 20 meq/Lidocaine HCl 2 ml/ Sodium Chloride  112 mls @ 56 mls/

hr IV Q2H [facility]


   Stop: 05/20/18 12:59


   Last Admin: 05/20/18 13:50 Dose:  56 mls/hr


Efavirenz [Sustiva] (600mg)  600 mg PO BEDTIME [facility]


   Last Admin: 05/19/18 22:15 Dose:  Not Given


Raltegravir [ (Isentress] 400mg)  400 mg PO BID [facility]


   Last Admin: 05/20/18 11:08 Dose:  400 mg


Potassium Chloride (Klor-Con M20)  40 meq PO ONETIME ONE


   Stop: 05/20/18 09:01


   Last Admin: 05/20/18 11:08 Dose:  40 meq


Raltegravir (Isentress)  400 mg PO BID [facility]


   Last Admin: 05/21/18 08:08 Dose:  400 mg


Sodium Bicarbonate (Sodium Bicarbonate)  1,300 mg PO BID [facility]


   Stop: 05/19/18 10:30


   Last Admin: 05/19/18 09:42 Dose:  1,300 mg


Vancomycin HCl (Vancomycin)  1 gm IV .PHARMACY TO DOSE [facility]











- Exam


Quality Assessment: No: Supplemental Oxygen


General: Alert, Oriented, Cooperative, No Acute Distress


Neck: Supple


Lungs: Normal Respiratory Effort


GI/Abdominal Exam: No Distention


Extremities: Other (no arm edema)


Skin: Warm, Dry


Psy/Mental Status: Alert, Normal Affect





- Problem List Review


Problem List Initiated/Reviewed/Updated: Yes





- My Orders


Last 24 Hours: 


My Active Orders





05/21/18 14:05


Discontinue Telemetry Monitoring [Cardiac Monitoring Discontinue] [RC] Click to 

Edit 





05/21/18 21:00


amLODIPine [Norvasc]   2.5 mg PO BEDTIME 





05/22/18 05:00


CBC W/O DIFF,HEMOGRAM [HEME] Timed (1) 


COMPREHENSIVE METABOLIC PN,CMP [CHEM] Timed 














- Plan


Plan:: 





ASSESSMENT AND PLAN





SEPTIC SHOCK SECONDARY TO ACUTE CYSTITIS - ongoing improvement with current 

antibiotic therapy. Sepsis and shock Resolved. He does have mild persistent 

tachycardia but otherwise is stable. Urine culture grew out Morganella. Blood 

cultures are growing a gram-negative rods with identification pending.


-Saline lock IV


-Blood and urine cultures pending


-Continue IV meropenem, narrow spectrum tomorrow when blood culture results are 

available





HYPOXIA - resolved





SINUS TACHYCARDIA - secondary to septic shock, mild persistent sinus 

tachycardia but otherwise stable.





ACUTE ON CHRONIC KIDNEY INJURY - at baseline has chronic kidney disease stage 

III, renal function continues slow improvement after hydration.


-Closely monitor urine output and renal function





ANAL CANCER - metastatic to regional lymph nodes. Currently in remission with 

no evidence of active disease and no recent chemotherapy or radiation therapy





BLADDER OUTLET OBSTRUCTION SECONDARY TO BPH - chronic indwelling Guzman catheter.


-Consider changing catheter change interval to 3 weeks





HIV - well managed at the present time, by history viral count has been 

undetectable over the past year


-Continue outpatient medical regimen





MAINTENANCE ISSUES


-DVT prophylaxis; mechanical with thrombocytopenia


-GI prophylaxis; not indicated


-Guzman catheter; chronic indwelling catheter secondary to bladder outlet 

obstruction


-Nutrition; regular diet





DISPOSITION - anticipate discharge to home after the hospital stay.





Hany Galaviz M.D.

## 2018-05-22 RX ADMIN — Medication SCH EACH: at 07:56

## 2018-05-22 RX ADMIN — RALTEGRAVIR SCH MG: 400 TABLET, FILM COATED ORAL at 21:20

## 2018-05-22 RX ADMIN — DIBASIC SODIUM PHOSPHATE, MONOBASIC POTASSIUM PHOSPHATE AND MONOBASIC SODIUM PHOSPHATE SCH MG: 852; 155; 130 TABLET ORAL at 21:21

## 2018-05-22 RX ADMIN — Medication SCH EACH: at 21:24

## 2018-05-22 RX ADMIN — RALTEGRAVIR SCH MG: 400 TABLET, FILM COATED ORAL at 09:28

## 2018-05-22 RX ADMIN — Medication SCH EACH: at 17:35

## 2018-05-22 RX ADMIN — Medication SCH EACH: at 21:23

## 2018-05-22 RX ADMIN — Medication SCH EACH: at 09:27

## 2018-05-22 NOTE — PCM.PN
- General Info


Date of Service: 05/22/18


Functional Status: Reports: Pain Controlled, Tolerating Diet, Ambulating





- Review of Systems


General: Reports: Weakness.  Denies: Fever


Gastrointestinal: Denies: Abdominal Pain


Systems Review Comment:: 





there were no acute events overnight. Patient currently does not have any 

abdominal pain. He did not have any fevers overnight. Heart rate has 

normalized. He says that he feels well and nearly back to his normal self other 

than some fatigue. He does feel weak and requires some standby assistance but 

in general has been able to get around on his own. Blood cultures grew out 

Morganella and enterococcus.





- Patient Data


Vitals - Most Recent: 


 Last Vital Signs











Temp  36.4 C   05/22/18 07:36


 


Pulse  91   05/22/18 07:36


 


Resp  16   05/22/18 07:36


 


BP  134/84   05/22/18 07:36


 


Pulse Ox  93 L  05/22/18 07:36











Weight - Most Recent: 75.568 kg


I&O - Last 24 Hours: 


 Intake & Output











 05/21/18 05/22/18 05/22/18





 22:59 06:59 14:59


 


Intake Total 500 100 560


 


Output Total 1370 2150 700


 


Balance -870 -2050 -140











Lab Results Last 24 Hours: 


 Laboratory Results - last 24 hr











  05/22/18 05/22/18 Range/Units





  05:00 05:00 


 


WBC  8.9   (4.5-11.0)  K/uL


 


RBC  3.16 L   (4.30-5.90)  M/uL


 


Hgb  12.4   (12.0-15.0)  g/dL


 


Hct  37.3 L   (40.0-54.0)  %


 


MCV  118 H   (80-98)  fL


 


MCH  39 H   (27-31)  pg


 


MCHC  33   (32-36)  %


 


Plt Count  53 L   (150-400)  K/uL


 


Sodium   140  (140-148)  mmol/L


 


Potassium   4.1  (3.6-5.2)  mmol/L


 


Chloride   111 H  (100-108)  mmol/L


 


Carbon Dioxide   20 L  (21-32)  mmol/L


 


Anion Gap   13.1  (5.0-14.0)  mmol/L


 


BUN   22 H  (7-18)  mg/dL


 


Creatinine   1.5 H  (0.8-1.3)  mg/dL


 


Est Cr Clr Drug Dosing   54.03  mL/min


 


Estimated GFR (MDRD)   49 L  (>60)  


 


Glucose   101  ()  mg/dL


 


Calcium   8.2 L  (8.5-10.1)  mg/dL


 


Total Bilirubin   0.6  (0.2-1.0)  mg/dL


 


AST   105 H  (15-37)  U/L


 


ALT   102 H  (12-78)  U/L


 


Alkaline Phosphatase   206 H  ()  U/L


 


Total Protein   5.2 L  (6.4-8.2)  g/dL


 


Albumin   2.3 L  (3.4-5.0)  g/dL


 


Globulin   2.9  (2.3-3.5)  g/dL


 


Albumin/Globulin Ratio   0.8 L  (1.2-2.2)  











Cameron Results Last 24 Hours: 


 Microbiology











 05/18/18 14:00 Aerobic Blood Culture - Final





 Blood - Arm, Left Anaerobic Blood Culture - Final


 


 05/18/18 14:14 Aerobic Blood Culture - Final





 Blood - Arm, Left    Morganella Species





    Enterococcus Faecalis





 Anaerobic Blood Culture - Final


 


 05/18/18 14:03 Urine Culture - Final





 Urine, Clean Catch    Morganella Species











Med Orders - Current: 


 Current Medications





Acetaminophen (Tylenol)  650 mg PO Q4H PRN


   PRN Reason: Pain (Mild 1-3)/fever


   Last Admin: 05/22/18 08:01 Dose:  650 mg


Albuterol (Proventil Neb Soln)  2.5 mg NEB Q4H PRN


   PRN Reason: Shortness Of Breath/wheezing


   Last Admin: 05/18/18 23:34 Dose:  2.5 mg


Amlodipine Besylate (Norvasc)  2.5 mg PO BEDTIME [facility]


   Last Admin: 05/21/18 20:15 Dose:  2.5 mg


Aspirin (Halfprin)  81 mg PO DAILY [facility]


   Last Admin: 05/22/18 09:28 Dose:  81 mg


Baclofen (Lioresal)  10 mg PO BID [facility]


   Last Admin: 05/22/18 09:28 Dose:  10 mg


Dapsone (Dapsone)  100 mg PO BEDTIME [facility]


   Last Admin: 05/21/18 20:17 Dose:  100 mg


Diphenoxylate HCl/Atropine (Lomotil 0.025-2.5 Mg)  1 - 2 tab PO TID PRN


   PRN Reason: Diarrhea


Finasteride (Proscar)  5 mg PO DAILY [facility]


   Last Admin: 05/22/18 09:28 Dose:  5 mg


Levothyroxine Sodium 100 mcg/ (Levothyroxine Sodium 75 mcg)  175 mcg PO 

ACBREAKFAST [facility]


   Last Admin: 05/22/18 07:55 Dose:  175 mcg


Magnesium Hydroxide (Milk Of Magnesia)  30 ml PO Q12H PRN


   PRN Reason: Constipation


Ondansetron HCl (Zofran)  4 mg IV Q4H PRN


   PRN Reason: Nausea/Vomiting


Oxycodone HCl (Oxycodone)  5 mg PO Q6H PRN


   PRN Reason: Pain


Sustiva 600mg (Ptom)  0 each PO BEDTIME [facility]


   Last Admin: 05/21/18 20:20 Dose:  Not Given


Potassium Citra Er (15meq Tab (Ptom))  0 each PO BIDMEALS [facility]


   Last Admin: 05/22/18 07:56 Dose:  1 each


Lamivudine/Zidovudine 150/300mg (Ptom)  0 each PO BID [facility]


   Last Admin: 05/22/18 09:27 Dose:  1 each


Polyethylene Glycol (Miralax)  17 gm PO DAILY PRN


   PRN Reason: Constipation


Raltegravir (Isentress)  400 mg PO BID [facility]


   Last Admin: 05/22/18 09:28 Dose:  400 mg


Sodium Bicarbonate (Sodium Bicarbonate)  650 mg PO BID [facility]


   Last Admin: 05/22/18 09:28 Dose:  650 mg


Sodium Chloride (Saline Flush)  10 ml FLUSH ASDIRECTED PRN


   PRN Reason: Keep Vein Open


Sodium Phosphate (Neutra-Phos)  250 mg PO BEDTIME [facility]


   Last Admin: 05/21/18 20:16 Dose:  250 mg


Tamsulosin HCl (Flomax)  0.4 mg PO DAILY [facility]


   Last Admin: 05/22/18 09:28 Dose:  0.4 mg


Trospium (Sanctura)  20 mg PO BID [facility]


   Last Admin: 05/22/18 09:28 Dose:  20 mg





Discontinued Medications





Acetaminophen (Tylenol Extra Strength)  1,000 mg PO ONETIME ONE


   Stop: 05/18/18 14:16


   Last Admin: 05/18/18 14:52 Dose:  1,000 mg


Adenosine (Adenocard)  6 mg IVPUSH NOW ONE


   Stop: 05/18/18 14:20


   Last Admin: 05/18/18 14:53 Dose:  6 mg


Dapsone (Dapsone)  100 mg PO DAILY [facility]


   Last Admin: 05/19/18 13:08 Dose:  Not Given


Enoxaparin Sodium (Lovenox)  40 mg SUBCUT DAILY@2000 [facility]


   Last Admin: 05/18/18 21:17 Dose:  40 mg


Furosemide (Lasix)  40 mg IVPUSH ONETIME ONE


   Stop: 05/18/18 23:49


   Last Admin: 05/18/18 23:58 Dose:  40 mg


Ceftriaxone Sodium 2 gm/ (Sodium Chloride)  50 mls @ 100 mls/hr IV Q8H [facility]


   Last Admin: 05/18/18 14:53 Dose:  100 mls/hr


Lactated Ringer's (Ringers, Lactated)  1,000 mls @ 999 mls/hr IV ASDIRECTED [facility]


Sodium Chloride (Normal Saline)  1,000 mls @ 1,000 mls/hr IV .BOLUS ONE


   Stop: 05/18/18 15:26


   Last Admin: 05/18/18 14:54 Dose:  1,000 mls/hr


Sodium Chloride (Normal Saline)  1,000 mls @ 1,000 mls/hr IV .BOLUS ONE


   Stop: 05/18/18 16:37


   Last Admin: 05/18/18 15:43 Dose:  1,000 mls/hr


Lactated Ringer's (Ringers, Lactated)  1,000 mls @ 500 mls/hr IV ASDIRECTED [facility]


   Stop: 05/18/18 21:28


   Last Admin: 05/18/18 19:24 Dose:  500 mls/hr


Lactated Ringer's (Ringers, Lactated)  1,000 mls @ 125 mls/hr IV ASDIRECTED [facility]


   Last Admin: 05/19/18 07:29 Dose:  125 mls/hr


Meropenem 1 gm/ Sodium (Chloride)  100 mls @ 200 mls/hr IV Q8H [facility]


   Last Admin: 05/22/18 11:58 Dose:  200 mls/hr


Vancomycin HCl 1 gm/ Sodium (Chloride)  250 mls @ 166.667 mls/hr IV Q12H [facility]


   Last Admin: 05/18/18 19:53 Dose:  166.667 mls/hr


Lactated Ringer's (Ringers, Lactated)  1,000 mls @ 500 mls/hr IV ASDIRECTED [facility]


   Stop: 05/19/18 00:01


   Last Admin: 05/18/18 22:14 Dose:  500 mls/hr


Vancomycin HCl 1 gm/ Sodium (Chloride)  250 mls @ 166.667 mls/hr IV Q12H [facility]


   Last Admin: 05/20/18 11:16 Dose:  Not Given


Magnesium Sulfate 2 gm/ Premix  50 mls @ 25 mls/hr IV ONETIME ONE


   Stop: 05/19/18 10:59


   Last Admin: 05/19/18 08:31 Dose:  25 mls/hr


Lactated Ringer's (Ringers, Lactated)  1,000 mls @ 50 mls/hr IV ASDIRECTED [facility]


   Last Admin: 05/19/18 16:59 Dose:  50 mls/hr


Potassium Chloride 20 meq/Lidocaine HCl 2 ml/ Sodium Chloride  112 mls @ 56 mls/

hr IV Q2H [facility]


   Stop: 05/20/18 12:59


   Last Admin: 05/20/18 13:50 Dose:  56 mls/hr


Efavirenz [Sustiva] (600mg)  600 mg PO BEDTIME [facility]


   Last Admin: 05/19/18 22:15 Dose:  Not Given


Raltegravir [ (Isentress] 400mg)  400 mg PO BID [facility]


   Last Admin: 05/20/18 11:08 Dose:  400 mg


Potassium Chloride (Klor-Con M20)  40 meq PO ONETIME ONE


   Stop: 05/20/18 09:01


   Last Admin: 05/20/18 11:08 Dose:  40 meq


Raltegravir (Isentress)  400 mg PO BID [facility]


   Last Admin: 05/21/18 08:08 Dose:  400 mg


Sodium Bicarbonate (Sodium Bicarbonate)  1,300 mg PO BID [facility]


   Stop: 05/19/18 10:30


   Last Admin: 05/19/18 09:42 Dose:  1,300 mg


Vancomycin HCl (Vancomycin)  1 gm IV .PHARMACY TO DOSE [facility]











- Exam


Quality Assessment: No: Supplemental Oxygen


General: Alert, Oriented, Cooperative, No Acute Distress


Neck: Supple


Lungs: Clear to Auscultation, Normal Respiratory Effort


Cardiovascular: Regular Rate, Regular Rhythm


GI/Abdominal Exam: Soft, No Distention


Extremities: No Pedal Edema


Psy/Mental Status: Alert, Normal Affect





- Problem List Review


Problem List Initiated/Reviewed/Updated: Yes





- My Orders


Last 24 Hours: 


My Active Orders





05/21/18 21:00


amLODIPine [Norvasc]   2.5 mg PO BEDTIME 





05/22/18 21:00


Ciprofloxacin [Ciprofloxacin HCl]   500 mg PO BID 














- Plan


Plan:: 





ASSESSMENT AND PLAN





SEPTIC SHOCK SECONDARY TO ACUTE CYSTITIS - ongoing improvement. Sepsis has 

resolved. Heart rate now back in the normal range. Urine culture grew out 

Morganella and blood cultures grew out Morganella and enterococcus. Doing well 

other than some weakness.


-Saline lock IV


-Transition antibiotics to ciprofloxacin





HYPOXIA - resolved





SINUS TACHYCARDIA - secondary to septic shock, now resolved. 





ACUTE ON CHRONIC KIDNEY INJURY - at baseline has chronic kidney disease stage 

III, renal function now back to baseline. 


-Closely monitor urine output and renal function





ANAL CANCER - metastatic to regional lymph nodes. Currently in remission with 

no evidence of active disease and no recent chemotherapy or radiation therapy





BLADDER OUTLET OBSTRUCTION SECONDARY TO BPH - chronic indwelling Guzman catheter.


-Consider changing catheter change interval to 3 weeks





HIV - well managed at the present time, by history viral count has been 

undetectable over the past year


-Continue outpatient medical regimen





MAINTENANCE ISSUES


-DVT prophylaxis; mechanical with thrombocytopenia


-GI prophylaxis; not indicated


-Guzman catheter; chronic indwelling catheter secondary to bladder outlet 

obstruction


-Nutrition; regular diet





DISPOSITION - anticipate discharge to home after the hospital stay, likely 

tomorrow if stable overnight 





Hany Galaviz M.D.

## 2018-05-23 RX ADMIN — Medication SCH EACH: at 08:12

## 2018-05-23 RX ADMIN — RALTEGRAVIR SCH MG: 400 TABLET, FILM COATED ORAL at 08:11

## 2018-05-23 RX ADMIN — Medication SCH EACH: at 08:11

## 2018-05-23 NOTE — PCM.DCSUM1
**Discharge Summary





- Hospital Course


Brief History: 53-year-old male with history of chronic indwelling Guzman 

catheter, HIV and anal cancer who presented to the emergency room with fever 

and abdominal pain several hours after his Guzman catheter was changed. He was 

admitted for management of septic shock secondary to urinary tract infection.





- Discharge Data


Discharge Date: 05/23/18


Discharge Disposition: Home, Self-Care 01


Condition: Good





- Discharge Diagnosis/Problem(s)


(1) Acute cystitis with positive culture


SNOMED Code(s): 388584225


   ICD Code: N30.00 - ACUTE CYSTITIS WITHOUT HEMATURIA   Status: Acute   

Current Visit: Yes   





(2) Sepsis due to Enterococcus


SNOMED Code(s): 684342738, 580002328


   ICD Code: A41.81 - SEPSIS DUE TO ENTEROCOCCUS   Status: Acute   Current Visit

: Yes   





(3) Acute kidney injury


SNOMED Code(s): 69214733


   ICD Code: N17.9 - ACUTE KIDNEY FAILURE, UNSPECIFIED   Status: Acute   

Current Visit: Yes   





(4) Lower urinary tract obstructive syndrome


SNOMED Code(s): 92032237


   ICD Code: N13.9 - OBSTRUCTIVE AND REFLUX UROPATHY, UNSPECIFIED   Status: 

Chronic   Current Visit: Yes   Problem Details: combo of BPH and radiation 

scarring   





(5) HIV (human immunodeficiency virus infection)


Status: Chronic   Current Visit: Yes   





(6) Anal cancer


SNOMED Code(s): 839787345


   ICD Code: C21.0 - MALIGNANT NEOPLASM OF ANUS, UNSPECIFIED   Status: Chronic 

  Current Visit: No   





- Patient Summary/Data


Hospital Course: 





Arie presented to the emergency room with fever and pelvic pain several 

hours after his chronic indwelling Guzman catheter was changed. Workup in the 

emergency room was suggestive of acute cystitis with sepsis syndrome. Evidence 

for sepsis included significant tachycardia and lactic acidosis. He received 

aggressive IV fluids and broad-spectrum antibiotics. Cultures were obtained 

prior to antibiotics and admission. He had evidence for acute kidney injury 

with a elevated creatinine. His Guzman catheter was changed in the emergency 

room. He was admitted to the intensive care unit for further management. 

Overnight following admission his tachycardia did slowly decrease. He did have 

an episode of hypoxia related to volume overload overnight following admission 

and did have his fluids decreased and received a dose of IV furosemide. Lactic 

acid level slowly improved but took some time to normalize. He showed some 

clinical improvement during this time as well. Strength was improving but he 

remained very weak. He remained tachycardic but his heart rate continued slow 

improvement. His lactic acid level did finally normalized after a couple days 

of treatment. By hospital day 2 his urine culture is growing a gram-negative 

tess. He also has gram-negative rods and gram-positive cocci in blood culture 

bottles. Broad-spectrum antibiotic coverage was continued. He had no further 

clinical improvement over the next couple of days. Heart rate did eventually 

normalize after several days of hospitalization. His blood pressures have all 

been stable. Acute kidney injury has been steadily improving and he has 

returned to baseline. Weakness has been steadily improving with exercises in 

the hospital. His urine culture did eventually grow out Morganella and his 

blood cultures grew out Morganella and enterococcus. Based on sensitivities we 

transitioned him to oral ciprofloxacin. Condition has remained stable after the 

transition to oral antibiotics. I believe he is safe for outpatient management 

at this time with improving strength and clinical stability. I am planning 9 

additional doses of oral antibiotic therapy to complete a total of 10 days 

given his complicated urinary tract infection.





I suspect the acute infection was a result of having his Guzman catheter placed 

given no urine draining from the catheter from the time it was replaced to 

arrival in the emergency room as well as the acute onset of symptoms within 

hours of the catheter being changed. Patient has never experienced difficulty 

like this in the past.





Also noted during the hospital stay was a mild elevation of AST, ALT and his 

alkaline phosphatase. This was likely a result of the severe sepsis and levels 

have been trending down. There were no significant issues with chronic medical 

conditions such as HIV or his anal cancer. During hospital stay he did have 

mild hypoxia related to volume overload but this has resolved.





- Patient Instructions


Diet: Regular Diet as Tolerated


Activity: As Tolerated


Driving: May Drive Today


Showering/Bathing: May Shower


Notify Provider of: Fever, Increased Pain, Nausea and/or Vomiting


Other/Special Instructions: 1. You were in the hospital for management of 

sepsis caused by a urinary tract infection. Your condition has been improving 

with IV fluid administration as well as antibiotic use. Your cultures did grow 

out 2 different bacteria including Morganella and Enterococcus. Both of these 

bacteria are sensitive to ciprofloxacin and I recommend 9 additional doses of 

this antibiotic. You should take ciprofloxacin 500 mg twice daily for 9 doses 

with your next dose due tonight.  2. Continue your usual home medications as 

previously prescribed.  3. Follow up with your primary care and urology as 

scheduled.  4. Seek medical attention if you develop fever greater than 101, 

you have severe abdominal pain or if you develop persistent vomiting or severe 

diarrhea.





- Discharge Plan


Prescriptions/Med Rec: 


Ciprofloxacin HCl [Cipro] 500 mg PO BID #9 tablet


Home Medications: 


 Home Meds





Alpha-D-Galactosidase [Beano] 2 each PO ASDIRECTED PRN 05/30/14 [History]


Ascorbic Acid [Vitamin C] 500 mg PO DAILY 05/30/14 [History]


Aspirin [Adult Low Dose Aspirin EC] 81 mg PO DAILY 05/30/14 [History]


Ca Cmb No.1/Vit D3/B-6/FA/B12 [Vitamin D3 1,000 Unit] 1 each PO DAILY 05/30/14 [

History]


Calcium Carbonate/Vitamin D3 [Calcium 600 + Vit D Tablet] 1 each PO DAILY 05/30/ 14 [History]


Cyanocobalamin (Vitamin B-12) [Vitamin B-12] 500 mcg PO DAILY 05/30/14 [History]


Diphenoxylate HCl/Atropine [Lomotil] 1 - 2 tab PO TID PRN 05/30/14 [History]


Efavirenz [Sustiva] 600 mg PO BEDTIME 05/30/14 [History]


Fish Oil/Omega-3 Fatty Acids [Fish Oil 1,000 MG] 1 each PO DAILY 05/30/14 [

History]


Levothyroxine Sodium [Synthroid] 175 mcg PO DAILY 05/30/14 [History]


Psyllium with Sucrose [Metamucil] 1 each PO ASDIRECTED PRN 05/30/14 [History]


Raltegravir [Isentress] 400 mg PO BID 05/30/14 [History]


Solifenacin [Vesicare] 10 mg PO DAILY 05/30/14 [History]


Tamsulosin [Flomax] 0.4 mg PO DAILY 05/30/14 [History]


lamiVUDine/Zidovudine [Combivir 150-300 MG] 1 tab PO BID 05/30/14 [History]


Alendronate Sodium [Fosamax] 70 mg PO ASDIRECTED 11/15/17 [History]


Amoxicillin 1 tab PO ASDIRECTED PRN 11/15/17 [History]


Baclofen 10 mg PO BID 11/15/17 [History]


Finasteride 1 tab PO DAILY 11/15/17 [History]


Phosphorus #1 [Virt-Phos 250 Neutral Tablet] 1 tab PO DAILY 11/15/17 [History]


Potassium Citrate [Potassium Citrate ER] 15 meq PO BID 11/15/17 [History]


Sodium Bicarbonate 1 tab PO BID 11/15/17 [History]


oxyCODONE 1 tab PO Q6H PRN 11/15/17 [History]


Dapsone 1 tab PO DAILY 02/15/18 [History]


amLODIPine [Norvasc] 2.5 mg PO BEDTIME 05/20/18 [History]


Ciprofloxacin HCl [Cipro] 500 mg PO BID #9 tablet 05/23/18 [Rx]








Patient Handouts:  Indwelling Urinary Catheter Care, Adult, Indwelling Urinary 

Catheter Insertion, Indwelling Urinary Catheter Insertion, Care After, 

Ciprofloxacin tablets


Referrals: 


Jacinto Fung MD [Primary Care Provider] -  (f/u as needed after the 

hospital stay )





- Discharge Summary/Plan Comment


DC Time >30 min.: No (25)





- Patient Data


Vitals - Most Recent: 


 Last Vital Signs











Temp  35.8 C   05/23/18 07:31


 


Pulse  67   05/23/18 07:31


 


Resp  18   05/23/18 07:31


 


BP  134/83   05/23/18 07:31


 


Pulse Ox  92 L  05/23/18 07:31











Weight - Most Recent: 72.802 kg


I&O - Last 24 hours: 


 Intake & Output











 05/22/18 05/23/18 05/23/18





 22:59 06:59 14:59


 


Intake Total 1500  


 


Output Total 900 3300 


 


Balance 600 -3300 











JACE Results - Last 24 hrs: 


 Microbiology











 05/18/18 14:00 Aerobic Blood Culture - Final





 Blood - Arm, Left Anaerobic Blood Culture - Final


 


 05/18/18 14:14 Aerobic Blood Culture - Final





 Blood - Arm, Left    Morganella Species





    Enterococcus Faecalis





 Anaerobic Blood Culture - Final











Med Orders - Current: 


 Current Medications





Acetaminophen (Tylenol)  650 mg PO Q4H PRN


   PRN Reason: Pain (Mild 1-3)/fever


   Last Admin: 05/22/18 21:17 Dose:  650 mg


Albuterol (Proventil Neb Soln)  2.5 mg NEB Q4H PRN


   PRN Reason: Shortness Of Breath/wheezing


   Last Admin: 05/18/18 23:34 Dose:  2.5 mg


Amlodipine Besylate (Norvasc)  2.5 mg PO BEDTIME [facility]


   Last Admin: 05/22/18 21:22 Dose:  2.5 mg


Aspirin (Halfprin)  81 mg PO DAILY [facility]


   Last Admin: 05/23/18 08:10 Dose:  81 mg


Baclofen (Lioresal)  10 mg PO BID [facility]


   Last Admin: 05/23/18 08:10 Dose:  10 mg


Ciprofloxacin (Ciprofloxacin Hcl)  500 mg PO BID [facility]


   Last Admin: 05/23/18 08:10 Dose:  500 mg


Dapsone (Dapsone)  100 mg PO BEDTIME [facility]


   Last Admin: 05/22/18 21:19 Dose:  100 mg


Diphenoxylate HCl/Atropine (Lomotil 0.025-2.5 Mg)  1 - 2 tab PO TID PRN


   PRN Reason: Diarrhea


Finasteride (Proscar)  5 mg PO DAILY [facility]


   Last Admin: 05/23/18 08:10 Dose:  5 mg


Heparin Sodium (Porcine) (Heparin Lock Flush 100 Units/Ml)  500 units FLUSH 

ASDIRECTED PRN


   PRN Reason: Keep Vein Open


   Last Admin: 05/23/18 08:24 Dose:  500 units


Levothyroxine Sodium 100 mcg/ (Levothyroxine Sodium 75 mcg)  175 mcg PO 

ACBREAKFAST [facility]


   Last Admin: 05/23/18 08:08 Dose:  175 mcg


Magnesium Hydroxide (Milk Of Magnesia)  30 ml PO Q12H PRN


   PRN Reason: Constipation


Ondansetron HCl (Zofran)  4 mg IV Q4H PRN


   PRN Reason: Nausea/Vomiting


Oxycodone HCl (Oxycodone)  5 mg PO Q6H PRN


   PRN Reason: Pain


Sustiva 600mg (Ptom)  0 each PO BEDTIME [facility]


   Last Admin: 05/22/18 21:24 Dose:  1 each


Potassium Citra Er (15meq Tab (Ptom))  0 each PO BIDMEALS [facility]


   Last Admin: 05/23/18 08:11 Dose:  1 each


Lamivudine/Zidovudine 150/300mg (Ptom)  0 each PO BID [facility]


   Last Admin: 05/23/18 08:12 Dose:  1 each


Polyethylene Glycol (Miralax)  17 gm PO DAILY PRN


   PRN Reason: Constipation


Raltegravir (Isentress)  400 mg PO BID [facility]


   Last Admin: 05/23/18 08:11 Dose:  400 mg


Sodium Bicarbonate (Sodium Bicarbonate)  650 mg PO BID [facility]


   Last Admin: 05/23/18 08:10 Dose:  650 mg


Sodium Chloride (Saline Flush)  10 ml FLUSH ASDIRECTED PRN


   PRN Reason: Keep Vein Open


Sodium Phosphate (Neutra-Phos)  250 mg PO BEDTIME [facility]


   Last Admin: 05/22/18 21:21 Dose:  250 mg


Tamsulosin HCl (Flomax)  0.4 mg PO DAILY [facility]


   Last Admin: 05/23/18 08:10 Dose:  0.4 mg


Trospium (Sanctura)  20 mg PO BID [facility]


   Last Admin: 05/23/18 08:10 Dose:  20 mg





Discontinued Medications





Acetaminophen (Tylenol Extra Strength)  1,000 mg PO ONETIME ONE


   Stop: 05/18/18 14:16


   Last Admin: 05/18/18 14:52 Dose:  1,000 mg


Adenosine (Adenocard)  6 mg IVPUSH NOW ONE


   Stop: 05/18/18 14:20


   Last Admin: 05/18/18 14:53 Dose:  6 mg


Dapsone (Dapsone)  100 mg PO DAILY [facility]


   Last Admin: 05/19/18 13:08 Dose:  Not Given


Enoxaparin Sodium (Lovenox)  40 mg SUBCUT DAILY@2000 [facility]


   Last Admin: 05/18/18 21:17 Dose:  40 mg


Furosemide (Lasix)  40 mg IVPUSH ONETIME ONE


   Stop: 05/18/18 23:49


   Last Admin: 05/18/18 23:58 Dose:  40 mg


Ceftriaxone Sodium 2 gm/ (Sodium Chloride)  50 mls @ 100 mls/hr IV Q8H [facility]


   Last Admin: 05/18/18 14:53 Dose:  100 mls/hr


Lactated Ringer's (Ringers, Lactated)  1,000 mls @ 999 mls/hr IV ASDIRECTED [facility]


Sodium Chloride (Normal Saline)  1,000 mls @ 1,000 mls/hr IV .BOLUS ONE


   Stop: 05/18/18 15:26


   Last Admin: 05/18/18 14:54 Dose:  1,000 mls/hr


Sodium Chloride (Normal Saline)  1,000 mls @ 1,000 mls/hr IV .BOLUS ONE


   Stop: 05/18/18 16:37


   Last Admin: 05/18/18 15:43 Dose:  1,000 mls/hr


Lactated Ringer's (Ringers, Lactated)  1,000 mls @ 500 mls/hr IV ASDIRECTED [facility]


   Stop: 05/18/18 21:28


   Last Admin: 05/18/18 19:24 Dose:  500 mls/hr


Lactated Ringer's (Ringers, Lactated)  1,000 mls @ 125 mls/hr IV ASDIRECTED [facility]


   Last Admin: 05/19/18 07:29 Dose:  125 mls/hr


Meropenem 1 gm/ Sodium (Chloride)  100 mls @ 200 mls/hr IV Q8H [facility]


   Last Admin: 05/22/18 11:58 Dose:  200 mls/hr


Vancomycin HCl 1 gm/ Sodium (Chloride)  250 mls @ 166.667 mls/hr IV Q12H [facility]


   Last Admin: 05/18/18 19:53 Dose:  166.667 mls/hr


Lactated Ringer's (Ringers, Lactated)  1,000 mls @ 500 mls/hr IV ASDIRECTED [facility]


   Stop: 05/19/18 00:01


   Last Admin: 05/18/18 22:14 Dose:  500 mls/hr


Vancomycin HCl 1 gm/ Sodium (Chloride)  250 mls @ 166.667 mls/hr IV Q12H [facility]


   Last Admin: 05/20/18 11:16 Dose:  Not Given


Magnesium Sulfate 2 gm/ Premix  50 mls @ 25 mls/hr IV ONETIME ONE


   Stop: 05/19/18 10:59


   Last Admin: 05/19/18 08:31 Dose:  25 mls/hr


Lactated Ringer's (Ringers, Lactated)  1,000 mls @ 50 mls/hr IV ASDIRECTED [facility]


   Last Admin: 05/19/18 16:59 Dose:  50 mls/hr


Potassium Chloride 20 meq/Lidocaine HCl 2 ml/ Sodium Chloride  112 mls @ 56 mls/

hr IV Q2H [facility]


   Stop: 05/20/18 12:59


   Last Admin: 05/20/18 13:50 Dose:  56 mls/hr


Efavirenz [Sustiva] (600mg)  600 mg PO BEDTIME [facility]


   Last Admin: 05/19/18 22:15 Dose:  Not Given


Raltegravir [ (Isentress] 400mg)  400 mg PO BID [facility]


   Last Admin: 05/20/18 11:08 Dose:  400 mg


Potassium Chloride (Klor-Con M20)  40 meq PO ONETIME ONE


   Stop: 05/20/18 09:01


   Last Admin: 05/20/18 11:08 Dose:  40 meq


Raltegravir (Isentress)  400 mg PO BID [facility]


   Last Admin: 05/21/18 08:08 Dose:  400 mg


Sodium Bicarbonate (Sodium Bicarbonate)  1,300 mg PO BID [facility]


   Stop: 05/19/18 10:30


   Last Admin: 05/19/18 09:42 Dose:  1,300 mg


Vancomycin HCl (Vancomycin)  1 gm IV .PHARMACY TO DOSE [facility]











- Exam


Quality Assessment: Denies: Supplemental Oxygen


General: Reports: Alert, Oriented, Cooperative, No Acute Distress


Neck: Reports: Supple


Lungs: Reports: Normal Respiratory Effort


GI/Abdominal Exam: Soft, No Distention


Extremities: No Pedal Edema


Psy/Mental Status: Reports: Alert, Normal Affect

## 2018-07-05 ENCOUNTER — HOSPITAL ENCOUNTER (EMERGENCY)
Dept: HOSPITAL 11 - JP.ED | Age: 53
Discharge: HOME | End: 2018-07-05
Payer: MEDICARE

## 2018-07-05 DIAGNOSIS — E03.9: ICD-10-CM

## 2018-07-05 DIAGNOSIS — Z79.82: ICD-10-CM

## 2018-07-05 DIAGNOSIS — Z87.891: ICD-10-CM

## 2018-07-05 DIAGNOSIS — Z79.899: ICD-10-CM

## 2018-07-05 DIAGNOSIS — Z91.09: ICD-10-CM

## 2018-07-05 DIAGNOSIS — I10: ICD-10-CM

## 2018-07-05 DIAGNOSIS — Z21: ICD-10-CM

## 2018-07-05 DIAGNOSIS — L03.115: Primary | ICD-10-CM

## 2018-07-05 PROCEDURE — 93971 EXTREMITY STUDY: CPT

## 2018-07-05 PROCEDURE — 85027 COMPLETE CBC AUTOMATED: CPT

## 2018-07-05 PROCEDURE — 99284 EMERGENCY DEPT VISIT MOD MDM: CPT

## 2018-07-05 PROCEDURE — 36415 COLL VENOUS BLD VENIPUNCTURE: CPT

## 2018-07-05 PROCEDURE — 80048 BASIC METABOLIC PNL TOTAL CA: CPT

## 2018-07-05 NOTE — EDM.PDOC
ED HPI GENERAL MEDICAL PROBLEM





- General


Chief Complaint: Lower Extremity Injury/Pain


Stated Complaint: SWOLLEN CALF RIGHT LEG


Time Seen by Provider: 07/05/18 02:20


Source of Information: Reports: Patient, Old Records, RN Notes Reviewed


History Limitations: Reports: No Limitations





- History of Present Illness


INITIAL COMMENTS - FREE TEXT/NARRATIVE: 





Drove himself here





Chief complaint


Pain and swelling right calf





History of present illness


53-year-old male, lives locally, started noticing swelling and pain in his left 

calf about 7 PM on July 3. At 11 PM he started developing a fever that evening.


Discomfort has increased is also swelling and redness and he has come in 

worrying about blood clot and also by infection in that leg.





No history of recent injury





History of DVT 2011 he can't remember which leg





Recent admission to hospital May 18 with sepsis developed after urinary 

catheter change.





He has HIV for which she is under treatment. He is on dapsone for prevention of 

infection


He has an indwelling catheter because of prostatic enlargement causing urinary 

retention that started in November of last year.


History of lung cancer and of anal cancer which [place] to the lymph nodes of 

the groin. Followed by oncology at [facility]





No cough or cold symptoms


History of lymphedema but this is far more painf and that


  ** Right Lower Leg


Pain Score (Numeric/FACES): 9





- Related Data


 Allergies











Allergy/AdvReac Type Severity Reaction Status Date / Time


 


abacavir Allergy Severe Fever Verified 07/05/18 01:43











Home Meds: 


 Home Meds





Alpha-D-Galactosidase [Beano] 2 each PO ASDIRECTED PRN 05/30/14 [History]


Ascorbic Acid [Vitamin C] 500 mg PO DAILY 05/30/14 [History]


Aspirin [Adult Low Dose Aspirin EC] 81 mg PO DAILY 05/30/14 [History]


Ca Cmb No.1/Vit D3/B-6/FA/B12 [Vitamin D3 1,000 Unit] 1 each PO DAILY 05/30/14 [

History]


Calcium Carbonate/Vitamin D3 [Calcium 600 + Vit D Tablet] 1 each PO DAILY 05/30/ 14 [History]


Cyanocobalamin (Vitamin B-12) [Vitamin B-12] 500 mcg PO DAILY 05/30/14 [History]


Diphenoxylate HCl/Atropine [Lomotil] 1 - 2 tab PO TID PRN 05/30/14 [History]


Efavirenz [Sustiva] 600 mg PO BEDTIME 05/30/14 [History]


Fish Oil/Omega-3 Fatty Acids [Fish Oil 1,000 MG] 1 each PO DAILY 05/30/14 [

History]


Levothyroxine Sodium [Synthroid] 175 mcg PO DAILY 05/30/14 [History]


Psyllium with Sucrose [Metamucil] 1 each PO ASDIRECTED PRN 05/30/14 [History]


Raltegravir [Isentress] 400 mg PO BID 05/30/14 [History]


Solifenacin [Vesicare] 10 mg PO DAILY 05/30/14 [History]


Tamsulosin [Flomax] 0.4 mg PO DAILY 05/30/14 [History]


lamiVUDine/Zidovudine [Combivir 150-300 MG] 1 tab PO BID 05/30/14 [History]


Alendronate Sodium [Fosamax] 70 mg PO ASDIRECTED 11/15/17 [History]


Amoxicillin 1 tab PO ASDIRECTED PRN 11/15/17 [History]


Baclofen 10 mg PO BID 11/15/17 [History]


Finasteride 1 tab PO DAILY 11/15/17 [History]


Phosphorus #1 [Virt-Phos 250 Neutral Tablet] 1 tab PO DAILY 11/15/17 [History]


Potassium Citrate [Potassium Citrate ER] 15 meq PO BID 11/15/17 [History]


Sodium Bicarbonate 1 tab PO BID 11/15/17 [History]


oxyCODONE 1 tab PO Q6H PRN 11/15/17 [History]


Dapsone 1 tab PO DAILY 02/15/18 [History]


amLODIPine [Norvasc] 2.5 mg PO BEDTIME 05/20/18 [History]











Past Medical History


HEENT History: Reports: Impaired Vision


Cardiovascular History: Reports: Hypertension


Gastrointestinal History: Reports: Other (See Below)


Other Gastrointestinal History: tumor 2009 removal in left groin, right groin 

tumor removal in 2014


Genitourinary History: Reports: Prostate Disorder, Other (See Below)


Other Genitourinary History: cystogram of bladder 2017


Musculoskeletal History: Reports: Fracture


Other Musculoskeletal History: fracture right wrist and right hip 2015 screws 

in hip


Neurological History: Reports: Other (See Below)


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


Endocrine/Metabolic History: Reports: Hypothyroidism


Hematologic History: Reports: Anemia, Blood Transfusion(s)


Immunologic History: Reports: HIV


Oncologic (Cancer) History: Reports: Lung, Squamous Cell Carcinoma, Other (See 

Below)


Other Oncologic History: anal cancer





- Infectious Disease History


Infectious Disease History: Reports: HIV-Human Immunodeficiency Virus, Measles





- Past Surgical History


Respiratory Surgical History: Reports: Lung Resection


Other Respiratory Surgeries/Procedures: right lung resection 2008


GI Surgical History: Reports: Colonoscopy, Other (See Below)


Other GI Surgeries/Procedures: anal cancer in 2009 treated with radiation and 

chemo





Social & Family History





- Tobacco Use


Smoking Status *Q: Former Smoker


Used Tobacco, but Quit: Yes


Month/Year Tobacco Last Used: 2008





- Caffeine Use


Caffeine Use: Reports: Coffee





- Recreational Drug Use


Recreational Drug Use: No





Review of Systems





- Review of Systems


Review Of Systems: See Below


Constitutional: Reports: Fever


Eyes: Reports: No Symptoms


Ears: Reports: No Symptoms


Nose: Reports: No Symptoms


Mouth/Throat: Reports: No Symptoms


Respiratory: Reports: No Symptoms


GI/Abdominal: Reports: No Symptoms


Genitourinary: Reports: Other (Indwelling catheter)


Musculoskeletal: Reports: Leg Pain


Skin: Reports: Erythema (Red swollen right calf)


Neurological: Reports: No Symptoms


Psychiatric: Reports: No Symptoms





ED EXAM, GENERAL





- Physical Exam


Exam: See Below


Exam Limited By: No Limitations


General Appearance: Alert, Mild Distress, Other (Mild tachycardia and fever, 

nontoxic, no difficulty speaking or breathing)


Eye Exam: Bilateral Eye: Normal Inspection


Ears: Normal External Exam


Ear Exam: Bilateral Ear: Auricle Normal, Canal Normal, TM normal


Nose: Normal Inspection


Throat/Mouth: Normal Inspection


Head: Atraumatic, Normocephalic


Neck: Normal Inspection, Non-Tender.  No: Lymphadenopathy (R), Lymphadenopathy (

L)


Respiratory/Chest: No Respiratory Distress, Lungs Clear, Normal Breath Sounds, 

No Accessory Muscle Use


Cardiovascular: Regular Rate, Rhythm, Tachycardia


GI/Abdominal: Normal Bowel Sounds, Non-Tender, No Distention


Extremities: Leg Pain (Right leg swollen tight warm to touch reddish compared 

to the left legPeripheral pulses intact), Increased Warmth


Neurological: Alert, Oriented, No Motor/Sensory Deficits


Skin Exam: Warm, Dry, Intact, No Rash, Erythema (right calf)


Lymphatic: No Adenopathy





Course





- Vital Signs


Last Recorded V/S: 


 Last Vital Signs











Temp  37.7 C   07/05/18 01:52


 


Pulse  102 H  07/05/18 01:52


 


Resp  18   07/05/18 01:52


 


BP  139/93 H  07/05/18 01:52


 


Pulse Ox  95   07/05/18 01:52














- Orders/Labs/Meds


Orders: 


 Active Orders 24 hr











 Category Date Time Status


 


 VL Duplex Lwr Ext Veins Ltd Rt [US] Stat Exams  07/05/18 02:32 Taken











Labs: 


 Laboratory Tests











  07/05/18 07/05/18 Range/Units





  02:42 02:42 


 


WBC   11.1 H  (4.5-11.0)  K/uL


 


RBC   3.50 L  (4.30-5.90)  M/uL


 


Hgb   13.6  (12.0-15.0)  g/dL


 


Hct   41.0  (40.0-54.0)  %


 


MCV   117 H  (80-98)  fL


 


MCH   39 H  (27-31)  pg


 


MCHC   33  (32-36)  %


 


Plt Count   85 L  (150-400)  K/uL


 


Sodium  137 L   (140-148)  mmol/L


 


Potassium  3.7   (3.6-5.2)  mmol/L


 


Chloride  104   (100-108)  mmol/L


 


Carbon Dioxide  20 L   (21-32)  mmol/L


 


Anion Gap  16.7 H   (5.0-14.0)  mmol/L


 


BUN  24 H   (7-18)  mg/dL


 


Creatinine  1.6 H   (0.8-1.3)  mg/dL


 


Est Cr Clr Drug Dosing  50.39   mL/min


 


Estimated GFR (MDRD)  45 L   (>60)  


 


Glucose  103   ()  mg/dL


 


Calcium  8.3 L   (8.5-10.1)  mg/dL











Meds: 


Medications














Discontinued Medications














Generic Name Dose Route Start Last Admin





  Trade Name Freq  PRN Reason Stop Dose Admin


 


Acetaminophen  1,000 mg  07/05/18 02:33  07/05/18 02:43





  Tylenol Extra Strength  PO  07/05/18 02:34  1,000 mg





  ONETIME ONE   Administration





     





     





     





     














- Re-Assessments/Exams


Free Text/Narrative Re-Assessment/Exam: 





07/05/18 02:43


53-year-old maleWith HIV, history of DVT, presenting with new pain and swelling 

redness heat right calf. Also has fever. 


Differential diagnosis includes cellulitis, DVT, skin changes





Acetaminophen thousand milligrams by mouth for fever


Labs ordered


Doppler right leg


07/05/18 04:10








Edema of the scrotum which he's had for considerable time due to the radiation 

treatment for his anal cancer, compromised the Doppler, but as best can be 

determined there was no evidence of DVT


No history of skin infection previously


No history of MRSA





Treat with cephalexin for cellulitis of the right leg


Follow-up primary care





Cephalexin 500 mg by mouth





Departure





- Departure


Time of Disposition: 04:11


Disposition: Home, Self-Care 01


Condition: Good


Clinical Impression: 


 Cellulitis of right lower leg








- Discharge Information


Instructions:  Cellulitis, Adult


Referrals: 


PCP,None [Primary Care Provider] - 


Forms:  ED Department Discharge


Additional Instructions: 


Please make an appointment for 1 week with your primary care for recheck of 

your leg


You may take acetaminophen for pain and fever as needed





- My Orders


Last 24 Hours: 


My Active Orders





07/05/18 02:32


VL Duplex Lwr Ext Veins Ltd Rt [US] Stat 














- Assessment/Plan


Last 24 Hours: 


My Active Orders





07/05/18 02:32


VL Duplex Lwr Ext Veins Ltd Rt [US] Stat

## 2018-07-05 NOTE — US
VL Duplex Lwr Ext Veins Ltd Rt

 

INDICATION:   swelling pain rt calf, hx DVT

 

TECHNIQUE: The deep venous system of imaged, including the common femoral, deep femoral, superficial 
femoral, popliteal, and where visualized, calf veins.   Imaging included duplex, compression, and aug
mentation.

 

COMPARISON:   None

 

FINDINGS: Study is Limited.

There is no evidence of deep venous thrombosis.

 

Other incidental findings: Soft tissue edema. No focal fluid collections.

 

IMPRESSION: No evidence of deep venous thrombosis. Soft tissue edema.

## 2018-08-11 ENCOUNTER — HOSPITAL ENCOUNTER (EMERGENCY)
Dept: HOSPITAL 11 - JP.ED | Age: 53
Discharge: HOME | End: 2018-08-11
Payer: MEDICARE

## 2018-08-11 DIAGNOSIS — Z88.8: ICD-10-CM

## 2018-08-11 DIAGNOSIS — T83.091A: Primary | ICD-10-CM

## 2018-08-11 DIAGNOSIS — Z79.82: ICD-10-CM

## 2018-08-11 DIAGNOSIS — I10: ICD-10-CM

## 2018-08-11 NOTE — EDM.PDOC
ED HPI GENERAL MEDICAL PROBLEM





- General


Chief Complaint: Genitourinary Problem


Stated Complaint: CATHETER IS NOT DRAINING


Time Seen by Provider: 08/11/18 11:08


Source of Information: Reports: Patient, RN Notes Reviewed


History Limitations: Reports: No Limitations





- History of Present Illness


INITIAL COMMENTS - FREE TEXT/NARRATIVE: 





53-year-old gentleman presents emergency department today concern of catheters 

dysfunction, he states he started having problems last night about 6 however 

when he awoke this morning he had increased abdominal pain in his catheter bag 

was empty.


  ** pelvic area


Pain Score (Numeric/FACES): 5





- Related Data


 Allergies











Allergy/AdvReac Type Severity Reaction Status Date / Time


 


abacavir Allergy Severe Fever Verified 08/11/18 10:00











Home Meds: 


 Home Meds





Alpha-D-Galactosidase [Beano] 2 each PO ASDIRECTED PRN 05/30/14 [History]


Ascorbic Acid [Vitamin C] 500 mg PO DAILY 05/30/14 [History]


Aspirin [Adult Low Dose Aspirin EC] 81 mg PO DAILY 05/30/14 [History]


Ca Cmb No.1/Vit D3/B-6/FA/B12 [Vitamin D3 1,000 Unit] 1 each PO DAILY 05/30/14 [

History]


Calcium Carbonate/Vitamin D3 [Calcium 600 + Vit D Tablet] 1 each PO DAILY 05/30/ 14 [History]


Cyanocobalamin (Vitamin B-12) [Vitamin B-12] 500 mcg PO DAILY 05/30/14 [History]


Diphenoxylate HCl/Atropine [Lomotil] 1 - 2 tab PO TID PRN 05/30/14 [History]


Efavirenz [Sustiva] 600 mg PO BEDTIME 05/30/14 [History]


Fish Oil/Omega-3 Fatty Acids [Fish Oil 1,000 MG] 1 each PO DAILY 05/30/14 [

History]


Levothyroxine Sodium [Synthroid] 175 mcg PO DAILY 05/30/14 [History]


Psyllium with Sucrose [Metamucil] 1 each PO ASDIRECTED PRN 05/30/14 [History]


Raltegravir [Isentress] 400 mg PO BID 05/30/14 [History]


Solifenacin [Vesicare] 10 mg PO DAILY 05/30/14 [History]


Tamsulosin [Flomax] 0.4 mg PO DAILY 05/30/14 [History]


lamiVUDine/Zidovudine [Combivir 150-300 MG] 1 tab PO BID 05/30/14 [History]


Alendronate Sodium [Fosamax] 70 mg PO ASDIRECTED 11/15/17 [History]


Amoxicillin 1 tab PO ASDIRECTED PRN 11/15/17 [History]


Baclofen 10 mg PO BID 11/15/17 [History]


Finasteride 1 tab PO DAILY 11/15/17 [History]


Potassium Citrate [Potassium Citrate ER] 15 meq PO BID 11/15/17 [History]


oxyCODONE 1 tab PO Q6H PRN 11/15/17 [History]


Dapsone 1 tab PO DAILY 02/15/18 [History]


amLODIPine [Norvasc] 2.5 mg PO BEDTIME 05/20/18 [History]











Past Medical History


HEENT History: Reports: Impaired Vision


Cardiovascular History: Reports: Hypertension


Gastrointestinal History: Reports: Other (See Below)


Other Gastrointestinal History: tumor 2009 removal in left groin, right groin 

tumor removal in 2014, right sided lymphedema


Genitourinary History: Reports: Prostate Disorder, Other (See Below)


Other Genitourinary History: cystogram of bladder 2017


Musculoskeletal History: Reports: Fracture


Other Musculoskeletal History: fracture right wrist and right hip 2015 screws 

in hip


Neurological History: Reports: Other (See Below)


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


Endocrine/Metabolic History: Reports: Hypothyroidism


Hematologic History: Reports: Anemia, Blood Transfusion(s)


Immunologic History: Reports: HIV


Oncologic (Cancer) History: Reports: Lung, Squamous Cell Carcinoma, Other (See 

Below)


Other Oncologic History: anal cancer





- Infectious Disease History


Infectious Disease History: Reports: HIV-Human Immunodeficiency Virus





- Past Surgical History


Cardiovascular Surgical History: Reports: None


Respiratory Surgical History: Reports: Lung Resection


Other Respiratory Surgeries/Procedures: right lung resection 2008


GI Surgical History: Reports: Colonoscopy, Other (See Below)


Other GI Surgeries/Procedures: anal cancer in 2009 treated with radiation and 

chemo





Social & Family History





- Tobacco Use


Smoking Status *Q: Never Smoker





- Caffeine Use


Caffeine Use: Reports: Coffee





- Recreational Drug Use


Recreational Drug Use: No





ED ROS GENERAL





- Review of Systems


Review Of Systems: See Below


Constitutional: Denies: Fever, Chills


GI/Abdominal: Reports: Abdominal Pain.  Denies: Nausea, Vomiting


: Reports: Urinary Retention





ED EXAM, RENAL/





- Physical Exam


Exam: See Below


Exam Limited By: No Limitations


General Appearance: Alert, WD/WN, No Apparent Distress


Respiratory/Chest: No Respiratory Distress


GI/Abdominal: Soft, Tender (Suprapubic)





Course





- Vital Signs


Last Recorded V/S: 


 Last Vital Signs











Temp  96.6 F   08/11/18 09:55


 


Pulse  113 H  08/11/18 09:55


 


Resp  16   08/11/18 09:55


 


BP  134/85   08/11/18 09:55


 


Pulse Ox  94 L  08/11/18 09:55














- Orders/Labs/Meds


Orders: 


 Active Orders 24 hr











 Category Date Time Status


 


 Bladder Scan [RC] ASDIRECTED Care  08/11/18 10:58 Active














Departure





- Departure


Time of Disposition: 11:37


Disposition: Home, Self-Care 01


Condition: Good


Clinical Impression: 


Complication, blocked Guzman catheter


Qualifiers:


 Encounter type: initial encounter Qualified Code(s): T83.091A - Other 

mechanical complication of indwelling urethral catheter, initial encounter








- Discharge Information


Referrals: 


Jacinto Fung MD [Primary Care Provider] - 


Forms:  ED Department Discharge


Additional Instructions: 


Follow-up with your primary care as needed





- My Orders


Last 24 Hours: 


My Active Orders





08/11/18 10:58


Bladder Scan [RC] ASDIRECTED 














- Assessment/Plan


Last 24 Hours: 


My Active Orders





08/11/18 10:58


Bladder Scan [RC] ASDIRECTED 











Plan: 





Assessment





Acuity = acute





Site and laterality = blocked Guzman catheter  





Etiology  = unclear etiology  





Manifestations = none  





Location of injury =  Home





Lab values = none  





Plan


Follow-up with his primary care as needed  

















 This note was dictated using dragon voice recognition software please call 

with any questions on syntax or grammar.

## 2018-08-24 ENCOUNTER — HOSPITAL ENCOUNTER (EMERGENCY)
Dept: HOSPITAL 11 - JP.ED | Age: 53
Discharge: HOME | End: 2018-08-24
Payer: MEDICARE

## 2018-08-24 DIAGNOSIS — B20: ICD-10-CM

## 2018-08-24 DIAGNOSIS — Z79.82: ICD-10-CM

## 2018-08-24 DIAGNOSIS — Z79.899: ICD-10-CM

## 2018-08-24 DIAGNOSIS — Z88.8: ICD-10-CM

## 2018-08-24 DIAGNOSIS — T83.091A: Primary | ICD-10-CM

## 2018-08-24 DIAGNOSIS — I10: ICD-10-CM

## 2018-08-24 NOTE — EDM.PDOC
ED HPI GENERAL MEDICAL PROBLEM





- General


Stated Complaint: BLOCKED CATH


Time Seen by Provider: 08/24/18 23:41


Source of Information: Reports: Patient, RN Notes Reviewed


History Limitations: Reports: No Limitations





- History of Present Illness


INITIAL COMMENTS - FREE TEXT/NARRATIVE: 





53-year-old gentleman presents emergency department day complaint of blocked 

Guzman catheter, he has had difficulty with this with sedimentation





- Related Data


 Allergies











Allergy/AdvReac Type Severity Reaction Status Date / Time


 


abacavir Allergy Severe Fever Verified 08/24/18 23:13











Home Meds: 


 Home Meds





Alpha-D-Galactosidase [Beano] 2 each PO ASDIRECTED PRN 05/30/14 [History]


Ascorbic Acid [Vitamin C] 500 mg PO DAILY 05/30/14 [History]


Aspirin [Adult Low Dose Aspirin EC] 81 mg PO DAILY 05/30/14 [History]


Ca Cmb No.1/Vit D3/B-6/FA/B12 [Vitamin D3 1,000 Unit] 1 each PO DAILY 05/30/14 [

History]


Calcium Carbonate/Vitamin D3 [Calcium 600 + Vit D Tablet] 1 each PO DAILY 05/30/ 14 [History]


Cyanocobalamin (Vitamin B-12) [Vitamin B-12] 500 mcg PO DAILY 05/30/14 [History]


Diphenoxylate HCl/Atropine [Lomotil] 1 - 2 tab PO TID PRN 05/30/14 [History]


Efavirenz [Sustiva] 600 mg PO BEDTIME 05/30/14 [History]


Fish Oil/Omega-3 Fatty Acids [Fish Oil 1,000 MG] 1 each PO DAILY 05/30/14 [

History]


Levothyroxine Sodium [Synthroid] 175 mcg PO DAILY 05/30/14 [History]


Psyllium with Sucrose [Metamucil] 1 each PO ASDIRECTED PRN 05/30/14 [History]


Raltegravir [Isentress] 400 mg PO BID 05/30/14 [History]


Solifenacin [Vesicare] 10 mg PO DAILY 05/30/14 [History]


Tamsulosin [Flomax] 0.4 mg PO DAILY 05/30/14 [History]


lamiVUDine/Zidovudine [Combivir 150-300 MG] 1 tab PO BID 05/30/14 [History]


Alendronate Sodium [Fosamax] 70 mg PO ASDIRECTED 11/15/17 [History]


Amoxicillin 1 tab PO ASDIRECTED PRN 11/15/17 [History]


Baclofen 10 mg PO BID 11/15/17 [History]


Finasteride 1 tab PO DAILY 11/15/17 [History]


Potassium Citrate [Potassium Citrate ER] 15 meq PO BID 11/15/17 [History]


oxyCODONE 1 tab PO Q6H PRN 11/15/17 [History]


Dapsone 1 tab PO DAILY 02/15/18 [History]


amLODIPine [Norvasc] 2.5 mg PO BEDTIME 05/20/18 [History]











Past Medical History


HEENT History: Reports: Impaired Vision


Cardiovascular History: Reports: Hypertension


Gastrointestinal History: Reports: Other (See Below)


Other Gastrointestinal History: tumor 2009 removal in left groin, right groin 

tumor removal in 2014, right sided lymphedema


Genitourinary History: Reports: Prostate Disorder, Other (See Below)


Other Genitourinary History: cystogram of bladder 2017


Musculoskeletal History: Reports: Fracture


Other Musculoskeletal History: fracture right wrist and right hip 2015 screws 

in hip


Neurological History: Reports: Other (See Below)


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


Endocrine/Metabolic History: Reports: Hypothyroidism


Hematologic History: Reports: Anemia, Blood Transfusion(s)


Immunologic History: Reports: HIV


Oncologic (Cancer) History: Reports: Lung, Squamous Cell Carcinoma, Other (See 

Below)


Other Oncologic History: anal cancer





- Infectious Disease History


Infectious Disease History: Reports: HIV-Human Immunodeficiency Virus





- Past Surgical History


Cardiovascular Surgical History: Reports: None


Respiratory Surgical History: Reports: Lung Resection


Other Respiratory Surgeries/Procedures: right lung resection 2008


GI Surgical History: Reports: Colonoscopy, Other (See Below)


Other GI Surgeries/Procedures: anal cancer in 2009 treated with radiation and 

chemo





Social & Family History





- Tobacco Use


Smoking Status *Q: Never Smoker





- Caffeine Use


Caffeine Use: Reports: Coffee





ED ROS GENERAL





- Review of Systems


Review Of Systems: See Below


Constitutional: Reports: No Symptoms


: Reports: Urinary Retention





ED EXAM, RENAL/





- Physical Exam


Exam: See Below


Exam Limited By: No Limitations


General Appearance: Alert, WD/WN, No Apparent Distress


Respiratory/Chest: No Respiratory Distress


GI/Abdominal: Soft, Non-Tender





Course





- Vital Signs


Last Recorded V/S: 





 Last Vital Signs











Temp  97.5 F   08/24/18 23:28


 


Pulse  82   08/24/18 23:28


 


Resp  14   08/24/18 23:28


 


BP  134/84   08/24/18 23:28


 


Pulse Ox  98   08/24/18 23:28














- Orders/Labs/Meds


Orders: 





 Active Orders 24 hr











 Category Date Time Status


 


 Bladder Scan [RC] ASDIRECTED Care  08/24/18 22:53 Active


 


 Guzman Catheter Insertion [Insert Urinary Catheter] [OM. Care  08/24/18 23:00 

Ordered





 PC] Q24H   


 


 Urinary Catheter Assessment [RC] ASDIRECTED Care  08/24/18 22:54 Active














Departure





- Departure


Time of Disposition: 23:43


Disposition: Home, Self-Care 01


Condition: Fair


Clinical Impression: 


Complication, blocked Guzman catheter


Qualifiers:


 Encounter type: initial encounter Qualified Code(s): T83.091A - Other 

mechanical complication of indwelling urethral catheter, initial encounter








- Discharge Information


Referrals: 


PCP,None [Primary Care Provider] - 


Additional Instructions: 


  Please followup with your primary care provider in 5-7 days if not better, 

please call return to the emergency department with worsening of symptoms.





- My Orders


Last 24 Hours: 





My Active Orders





08/24/18 22:53


Bladder Scan [RC] ASDIRECTED 





08/24/18 22:54


Urinary Catheter Assessment [RC] ASDIRECTED 





08/24/18 23:00


Guzman Catheter Insertion [Insert Urinary Catheter] [OM.PC] Q24H 














- Assessment/Plan


Last 24 Hours: 





My Active Orders





08/24/18 22:53


Bladder Scan [RC] ASDIRECTED 





08/24/18 22:54


Urinary Catheter Assessment [RC] ASDIRECTED 





08/24/18 23:00


Guzman Catheter Insertion [Insert Urinary Catheter] [OM.PC] Q24H 











Plan: 





Assessment





Acuity = acute





Site and laterality = Guzman catheter blocked dysfunction  





Etiology  = secondary to sediment  





Manifestations = none  





Location of injury =  Home





Lab values = none  





Plan


Guzman catheter was replaced by nursing staff, immediate relief of discomfort, 

follow-up with primary care as needed  

















 This note was dictated using dragon voice recognition software please call 

with any questions on syntax or grammar.

## 2018-08-26 ENCOUNTER — HOSPITAL ENCOUNTER (EMERGENCY)
Dept: HOSPITAL 11 - JP.ED | Age: 53
Discharge: HOME | End: 2018-08-26
Payer: MEDICARE

## 2018-08-26 DIAGNOSIS — L03.115: Primary | ICD-10-CM

## 2018-08-26 DIAGNOSIS — Z79.82: ICD-10-CM

## 2018-08-26 DIAGNOSIS — Z88.8: ICD-10-CM

## 2018-08-26 DIAGNOSIS — Z87.891: ICD-10-CM

## 2018-08-26 DIAGNOSIS — E03.9: ICD-10-CM

## 2018-08-26 DIAGNOSIS — I10: ICD-10-CM

## 2018-08-26 DIAGNOSIS — Z79.899: ICD-10-CM

## 2018-08-26 NOTE — EDM.PDOC
ED HPI GENERAL MEDICAL PROBLEM





- General


Chief Complaint: Skin Complaint


Stated Complaint: POSSIBLE CELLULITIS RIGHT LOWER LEG


Time Seen by Provider: 08/26/18 22:00


Source of Information: Reports: Patient, RN Notes Reviewed


History Limitations: Reports: No Limitations





- History of Present Illness


INITIAL COMMENTS - FREE TEXT/NARRATIVE: 





53-year-old gentleman presents to the emergency department today complaint of 

redness and swelling over his right leg, he has had cellulitis in this leg in 

the past he is concerned it has returned. He's had no fevers been ongoing for 

about 24 hours.


Treatments PTA: Reports: Acetaminophen


  ** right leg


Pain Score (Numeric/FACES): 8





- Related Data


 Allergies











Allergy/AdvReac Type Severity Reaction Status Date / Time


 


abacavir Allergy Severe Fever Verified 08/26/18 21:50











Home Meds: 


 Home Meds





Alpha-D-Galactosidase [Beano] 2 each PO ASDIRECTED PRN 05/30/14 [History]


Ascorbic Acid [Vitamin C] 500 mg PO DAILY 05/30/14 [History]


Aspirin [Adult Low Dose Aspirin EC] 81 mg PO DAILY 05/30/14 [History]


Ca Cmb No.1/Vit D3/B-6/FA/B12 [Vitamin D3 1,000 Unit] 1 each PO DAILY 05/30/14 [

History]


Calcium Carbonate/Vitamin D3 [Calcium 600 + Vit D Tablet] 1 each PO DAILY 05/30/ 14 [History]


Cyanocobalamin (Vitamin B-12) [Vitamin B-12] 500 mcg PO DAILY 05/30/14 [History]


Diphenoxylate HCl/Atropine [Lomotil] 1 - 2 tab PO TID PRN 05/30/14 [History]


Efavirenz [Sustiva] 600 mg PO BEDTIME 05/30/14 [History]


Fish Oil/Omega-3 Fatty Acids [Fish Oil 1,000 MG] 1 each PO DAILY 05/30/14 [

History]


Levothyroxine Sodium [Synthroid] 175 mcg PO DAILY 05/30/14 [History]


Psyllium with Sucrose [Metamucil] 1 each PO ASDIRECTED PRN 05/30/14 [History]


Raltegravir [Isentress] 400 mg PO BID 05/30/14 [History]


Solifenacin [Vesicare] 10 mg PO DAILY 05/30/14 [History]


Tamsulosin [Flomax] 0.4 mg PO DAILY 05/30/14 [History]


lamiVUDine/Zidovudine [Combivir 150-300 MG] 1 tab PO BID 05/30/14 [History]


Alendronate Sodium [Fosamax] 70 mg PO ASDIRECTED 11/15/17 [History]


Amoxicillin 1 tab PO ASDIRECTED PRN 11/15/17 [History]


Baclofen 10 mg PO BID 11/15/17 [History]


Finasteride 1 tab PO DAILY 11/15/17 [History]


Potassium Citrate [Potassium Citrate ER] 15 meq PO BID 11/15/17 [History]


oxyCODONE 1 tab PO Q6H PRN 11/15/17 [History]


Dapsone 1 tab PO DAILY 02/15/18 [History]


amLODIPine [Norvasc] 2.5 mg PO BEDTIME 05/20/18 [History]











Past Medical History


HEENT History: Reports: Impaired Vision


Cardiovascular History: Reports: Hypertension


Gastrointestinal History: Reports: Other (See Below)


Other Gastrointestinal History: tumor 2009 removal in left groin, right groin 

tumor removal in 2014, right sided lymphedema


Genitourinary History: Reports: Prostate Disorder, Other (See Below)


Other Genitourinary History: cystogram of bladder 2017


Musculoskeletal History: Reports: Fracture


Other Musculoskeletal History: fracture right wrist and right hip 2015 screws 

in hip


Neurological History: Reports: Other (See Below)


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


Endocrine/Metabolic History: Reports: Hypothyroidism


Hematologic History: Reports: Anemia, Blood Transfusion(s)


Immunologic History: Reports: HIV


Oncologic (Cancer) History: Reports: Lung, Squamous Cell Carcinoma, Other (See 

Below)


Other Oncologic History: anal cancer


Dermatologic History: Reports: Cellulitis





- Infectious Disease History


Infectious Disease History: Reports: HIV-Human Immunodeficiency Virus





- Past Surgical History


Cardiovascular Surgical History: Reports: None


Respiratory Surgical History: Reports: Lung Resection


Other Respiratory Surgeries/Procedures: right lung resection 2008


GI Surgical History: Reports: Colonoscopy, Other (See Below)


Other GI Surgeries/Procedures: anal cancer in 2009 treated with radiation and 

chemo





Social & Family History





- Tobacco Use


Smoking Status *Q: Former Smoker


Used Tobacco, but Quit: Yes


Month/Year Tobacco Last Used: 2008





- Caffeine Use


Caffeine Use: Reports: Coffee





- Recreational Drug Use


Recreational Drug Use: No





ED ROS GENERAL





- Review of Systems


Review Of Systems: See Below


Constitutional: Denies: Fever, Chills


HEENT: Reports: No Symptoms


Respiratory: Reports: No Symptoms


Cardiovascular: Reports: No Symptoms


GI/Abdominal: Reports: No Symptoms


Musculoskeletal: Reports: Leg Pain


Skin: Reports: Pallor, Rash, Erythema





ED EXAM, SKIN/RASH


Exam: See Below


Text/Narrative:: 





Examination of the right leg it is edematous pedal pulse is +2 it is warm to 

the touch tender to touch mainly in encompassing the calf


Exam Limited By: No Limitations


General Appearance: Alert, WD/WN, No Apparent Distress


Respiratory/Chest: No Respiratory Distress





Course





- Vital Signs


Last Recorded V/S: 


 Last Vital Signs











Temp  99.7 F   08/26/18 21:53


 


Pulse  96   08/26/18 22:41


 


Resp  15   08/26/18 22:41


 


BP  137/87   08/26/18 22:41


 


Pulse Ox  93 L  08/26/18 22:41














- Orders/Labs/Meds


Labs: 


 Laboratory Tests











  08/26/18 08/26/18 08/26/18 Range/Units





  22:20 22:20 22:20 


 


WBC   12.1 H   (4.5-11.0)  K/uL


 


RBC   3.32 L   (4.30-5.90)  M/uL


 


Hgb   13.5   (12.0-15.0)  g/dL


 


Hct   39.2 L   (40.0-54.0)  %


 


MCV   118 H   (80-98)  fL


 


MCH   41 H   (27-31)  pg


 


MCHC   34   (32-36)  %


 


Plt Count   93 L   (150-400)  K/uL


 


Neut % (Auto)   65   (36-66)  %


 


Lymph % (Auto)   26   (24-44)  %


 


Mono % (Auto)   9 H   (2-6)  %


 


Eos % (Auto)   0 L   (2-4)  %


 


Baso % (Auto)   0   (0-1)  %


 


Sodium  136 L    (140-148)  mmol/L


 


Potassium  3.7    (3.6-5.2)  mmol/L


 


Chloride  103    (100-108)  mmol/L


 


Carbon Dioxide  23    (21-32)  mmol/L


 


Anion Gap  13.7    (5.0-14.0)  mmol/L


 


BUN  20 H    (7-18)  mg/dL


 


Creatinine  1.6 H    (0.8-1.3)  mg/dL


 


Est Cr Clr Drug Dosing  50.47    mL/min


 


Estimated GFR (MDRD)  45 L    (>60)  


 


Glucose  101    ()  mg/dL


 


Lactic Acid    1.1  (0.4-2.0)  mmol/L


 


Calcium  8.7    (8.5-10.1)  mg/dL


 


Total Bilirubin  0.8    (0.2-1.0)  mg/dL


 


AST  60 H    (15-37)  U/L


 


ALT  70    (12-78)  U/L


 


Alkaline Phosphatase  103    ()  U/L


 


C-Reactive Protein     (0.0-0.3)  mg/dL


 


Total Protein  6.3 L    (6.4-8.2)  g/dL


 


Albumin  3.4    (3.4-5.0)  g/dL


 


Globulin  2.9    (2.3-3.5)  g/dL


 


Albumin/Globulin Ratio  1.2    (1.2-2.2)  














  08/26/18 Range/Units





  22:20 


 


WBC   (4.5-11.0)  K/uL


 


RBC   (4.30-5.90)  M/uL


 


Hgb   (12.0-15.0)  g/dL


 


Hct   (40.0-54.0)  %


 


MCV   (80-98)  fL


 


MCH   (27-31)  pg


 


MCHC   (32-36)  %


 


Plt Count   (150-400)  K/uL


 


Neut % (Auto)   (36-66)  %


 


Lymph % (Auto)   (24-44)  %


 


Mono % (Auto)   (2-6)  %


 


Eos % (Auto)   (2-4)  %


 


Baso % (Auto)   (0-1)  %


 


Sodium   (140-148)  mmol/L


 


Potassium   (3.6-5.2)  mmol/L


 


Chloride   (100-108)  mmol/L


 


Carbon Dioxide   (21-32)  mmol/L


 


Anion Gap   (5.0-14.0)  mmol/L


 


BUN   (7-18)  mg/dL


 


Creatinine   (0.8-1.3)  mg/dL


 


Est Cr Clr Drug Dosing   mL/min


 


Estimated GFR (MDRD)   (>60)  


 


Glucose   ()  mg/dL


 


Lactic Acid   (0.4-2.0)  mmol/L


 


Calcium   (8.5-10.1)  mg/dL


 


Total Bilirubin   (0.2-1.0)  mg/dL


 


AST   (15-37)  U/L


 


ALT   (12-78)  U/L


 


Alkaline Phosphatase   ()  U/L


 


C-Reactive Protein  15.77 H  (0.0-0.3)  mg/dL


 


Total Protein   (6.4-8.2)  g/dL


 


Albumin   (3.4-5.0)  g/dL


 


Globulin   (2.3-3.5)  g/dL


 


Albumin/Globulin Ratio   (1.2-2.2)  














Departure





- Departure


Time of Disposition: 23:02


Disposition: Home, Self-Care 01


Condition: Fair


Clinical Impression: 


 Cellulitis of right leg








- Discharge Information


Referrals: 


Jacinto Fung MD [Primary Care Provider] - 


Forms:  ED Department Discharge


Additional Instructions: 


Take full course of antibiotics,  Please followup with your primary care 

provider in 3-5 days if not better, please call return to the emergency 

department with worsening of symptoms.





- Assessment/Plan


Plan: 





Assessment





Acuity = acute





Site and laterality = right leg cellulitis  





Etiology  = probable bacterial cause  





Manifestations = edema, erythema  





Location of injury =  Home





Lab values = WBC elevated 12.1 consistent leukocytosis, creatinine elevated at 

1.6 consistent chronic renal failure stage GII lactic acid normal at 1.1 CRP 

elevated at 15.77  





Plan


Lites treat with Keflex 500 mg by mouth 3 times a day 7 days follow-up with 

primary care 3-5 days if no improvement  

















 This note was dictated using dragon voice recognition software please call 

with any questions on syntax or grammar.

## 2018-09-18 ENCOUNTER — HOSPITAL ENCOUNTER (EMERGENCY)
Dept: HOSPITAL 11 - JP.ED | Age: 53
Discharge: HOME | End: 2018-09-18
Payer: MEDICARE

## 2018-09-18 DIAGNOSIS — I10: ICD-10-CM

## 2018-09-18 DIAGNOSIS — Z79.899: ICD-10-CM

## 2018-09-18 DIAGNOSIS — Z87.891: ICD-10-CM

## 2018-09-18 DIAGNOSIS — E03.9: ICD-10-CM

## 2018-09-18 DIAGNOSIS — Z79.82: ICD-10-CM

## 2018-09-18 DIAGNOSIS — Z21: ICD-10-CM

## 2018-09-18 DIAGNOSIS — Z46.6: Primary | ICD-10-CM

## 2018-09-18 DIAGNOSIS — Z88.8: ICD-10-CM

## 2018-09-18 NOTE — EDM.PDOC
ED HPI GENERAL MEDICAL PROBLEM





- General


Chief Complaint: Genitourinary Problem


Stated Complaint: CATH PROBLEMS


Time Seen by Provider: 09/18/18 05:05


Source of Information: Reports: Patient


History Limitations: Reports: No Limitations





- History of Present Illness


INITIAL COMMENTS - FREE TEXT/NARRATIVE: 





Here for a catheter change. His catheter pluggged up and he doesn't have any 

way to flush it. No interest in learning to self cath.


  ** Bladder


Pain Score (Numeric/FACES): 0





- Related Data


 Allergies











Allergy/AdvReac Type Severity Reaction Status Date / Time


 


abacavir Allergy Severe Fever Verified 09/18/18 05:24











Home Meds: 


 Home Meds





Alpha-D-Galactosidase [Beano] 2 each PO ASDIRECTED PRN 05/30/14 [History]


Ascorbic Acid [Vitamin C] 500 mg PO DAILY 05/30/14 [History]


Aspirin [Adult Low Dose Aspirin EC] 81 mg PO DAILY 05/30/14 [History]


Ca Cmb No.1/Vit D3/B-6/FA/B12 [Vitamin D3 1,000 Unit] 1 each PO DAILY 05/30/14 [

History]


Calcium Carbonate/Vitamin D3 [Calcium 600 + Vit D Tablet] 1 each PO DAILY 05/30/ 14 [History]


Cyanocobalamin (Vitamin B-12) [Vitamin B-12] 500 mcg PO DAILY 05/30/14 [History]


Diphenoxylate HCl/Atropine [Lomotil] 1 - 2 tab PO TID PRN 05/30/14 [History]


Efavirenz [Sustiva] 600 mg PO BEDTIME 05/30/14 [History]


Fish Oil/Omega-3 Fatty Acids [Fish Oil 1,000 MG] 1 each PO DAILY 05/30/14 [

History]


Levothyroxine Sodium [Synthroid] 175 mcg PO DAILY 05/30/14 [History]


Psyllium with Sucrose [Metamucil] 1 each PO ASDIRECTED PRN 05/30/14 [History]


Raltegravir [Isentress] 400 mg PO BID 05/30/14 [History]


Solifenacin [Vesicare] 10 mg PO DAILY 05/30/14 [History]


Tamsulosin [Flomax] 0.4 mg PO DAILY 05/30/14 [History]


lamiVUDine/Zidovudine [Combivir 150-300 MG] 1 tab PO BID 05/30/14 [History]


Alendronate Sodium [Fosamax] 70 mg PO ASDIRECTED 11/15/17 [History]


Amoxicillin 1 tab PO ASDIRECTED PRN 11/15/17 [History]


Baclofen 10 mg PO BID 11/15/17 [History]


Finasteride 1 tab PO DAILY 11/15/17 [History]


Potassium Citrate [Potassium Citrate ER] 15 meq PO BID 11/15/17 [History]


oxyCODONE 1 tab PO Q6H PRN 11/15/17 [History]


Dapsone 1 tab PO DAILY 02/15/18 [History]


amLODIPine [Norvasc] 2.5 mg PO BEDTIME 05/20/18 [History]











Past Medical History


HEENT History: Reports: Impaired Vision


Cardiovascular History: Reports: Hypertension


Gastrointestinal History: Reports: Other (See Below)


Other Gastrointestinal History: tumor 2009 removal in left groin, right groin 

tumor removal in 2014, right sided lymphedema


Genitourinary History: Reports: Prostate Disorder, Other (See Below)


Other Genitourinary History: cystogram of bladder 2017


Musculoskeletal History: Reports: Fracture


Other Musculoskeletal History: fracture right wrist and right hip 2015 screws 

in hip


Neurological History: Reports: Other (See Below)


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


Endocrine/Metabolic History: Reports: Hypothyroidism


Hematologic History: Reports: Anemia, Blood Transfusion(s)


Immunologic History: Reports: HIV


Oncologic (Cancer) History: Reports: Lung, Squamous Cell Carcinoma, Other (See 

Below)


Other Oncologic History: anal cancer


Dermatologic History: Reports: Cellulitis





- Infectious Disease History


Infectious Disease History: Reports: Chicken Pox, Measles





- Past Surgical History


Cardiovascular Surgical History: Reports: None


Respiratory Surgical History: Reports: Lung Resection


Other Respiratory Surgeries/Procedures: right lung resection 2008


GI Surgical History: Reports: Colonoscopy, Other (See Below)


Other GI Surgeries/Procedures: anal cancer in 2009 treated with radiation and 

chemo





Social & Family History





- Tobacco Use


Smoking Status *Q: Former Smoker


Years of Tobacco use: 25


Packs/Tins Daily: 1


Used Tobacco, but Quit: Yes


Month/Year Tobacco Last Used: Oct2008


Second Hand Smoke Exposure: No





- Caffeine Use


Caffeine Use: Reports: Coffee, Soda





- Alcohol Use


Days Per Week of Alcohol Use: 0





- Recreational Drug Use


Recreational Drug Use: No





ED ROS GENERAL





- Review of Systems


Review Of Systems: ROS reveals no pertinent complaints other than HPI.





ED EXAM, RENAL/





- Physical Exam


Exam: See Below


Exam Limited By: No Limitations


General Appearance: Alert, WD/WN, No Apparent Distress


 (Male) Exam: Other (catheter pluggged as per nurse. Catheter has been 

changed.)





Course





- Vital Signs


Last Recorded V/S: 





 Last Vital Signs











Temp  35.9 C   09/18/18 05:34


 


Pulse  73   09/18/18 05:34


 


Resp  16   09/18/18 05:34


 


BP  129/83   09/18/18 05:34


 


Pulse Ox  96   09/18/18 05:34














- Orders/Labs/Meds


Meds: 





Medications














Discontinued Medications














Generic Name Dose Route Start Last Admin





  Trade Name Freq  PRN Reason Stop Dose Admin


 


Lidocaine HCl  10 ml  09/18/18 05:05  09/18/18 05:38





  Xylocaine 2% Jelly  MUCMEM  09/18/18 05:06  10 ml





  ONETIME ONE   Administration





     





     





     





     














Departure





- Departure


Time of Disposition: 06:09


Disposition: Home, Self-Care 01


Condition: Fair


Clinical Impression: 


 Guzman catheter problem








- Discharge Information


Instructions:  Indwelling Urinary Catheter Care, Adult


Referrals: 


PCP,None [Primary Care Provider] - 


Forms:  ED Department Discharge


Additional Instructions: 


continue catheter care as previously done.

## 2018-09-30 ENCOUNTER — HOSPITAL ENCOUNTER (EMERGENCY)
Dept: HOSPITAL 11 - JP.ED | Age: 53
Discharge: HOME | End: 2018-09-30
Payer: MEDICARE

## 2018-09-30 DIAGNOSIS — I10: ICD-10-CM

## 2018-09-30 DIAGNOSIS — Z79.899: ICD-10-CM

## 2018-09-30 DIAGNOSIS — Z88.8: ICD-10-CM

## 2018-09-30 DIAGNOSIS — Z21: ICD-10-CM

## 2018-09-30 DIAGNOSIS — T83.091A: Primary | ICD-10-CM

## 2018-09-30 NOTE — EDM.PDOC
ED HPI GENERAL MEDICAL PROBLEM





- General


Chief Complaint: Genitourinary Problem


Stated Complaint: CATH PROBLEMS


Time Seen by Provider: 09/30/18 02:11


Source of Information: Reports: Patient, Old Records, RN


History Limitations: Reports: No Limitations





- History of Present Illness


INITIAL COMMENTS - FREE TEXT/NARRATIVE: 





Here for a plugged guerrero catheter. Has been here before for the same problem. 


Onset: Today


Onset Date: 09/30/18


Duration: Minutes:, Getting Worse


Location: Reports: Abdomen (pelvis)


Quality: Reports: Pressure


Severity: Moderate


Improves with: Reports: None


Worsens with: Reports: Other (time)


Context: Reports: Other (recurrent catheter plugging)


Associated Symptoms: Reports: No Other Symptoms


Treatments PTA: Reports: Other (see below) (none)


  ** Pelvic


Pain Score (Numeric/FACES): 10





- Related Data


 Allergies











Allergy/AdvReac Type Severity Reaction Status Date / Time


 


abacavir Allergy Severe Fever Verified 09/30/18 02:25











Home Meds: 


 Home Meds





Alpha-D-Galactosidase [Beano] 2 each PO ASDIRECTED PRN 05/30/14 [History]


Ascorbic Acid [Vitamin C] 500 mg PO DAILY 05/30/14 [History]


Aspirin [Adult Low Dose Aspirin EC] 81 mg PO DAILY 05/30/14 [History]


Ca Cmb No.1/Vit D3/B-6/FA/B12 [Vitamin D3 1,000 Unit] 1 each PO DAILY 05/30/14 [

History]


Calcium Carbonate/Vitamin D3 [Calcium 600 + Vit D Tablet] 1 each PO DAILY 05/30/ 14 [History]


Cyanocobalamin (Vitamin B-12) [Vitamin B-12] 500 mcg PO DAILY 05/30/14 [History]


Diphenoxylate HCl/Atropine [Lomotil] 1 - 2 tab PO TID PRN 05/30/14 [History]


Efavirenz [Sustiva] 600 mg PO BEDTIME 05/30/14 [History]


Fish Oil/Omega-3 Fatty Acids [Fish Oil 1,000 MG] 1 each PO DAILY 05/30/14 [

History]


Levothyroxine Sodium [Synthroid] 175 mcg PO DAILY 05/30/14 [History]


Psyllium with Sucrose [Metamucil] 1 each PO ASDIRECTED PRN 05/30/14 [History]


Raltegravir [Isentress] 400 mg PO BID 05/30/14 [History]


Solifenacin [Vesicare] 10 mg PO DAILY 05/30/14 [History]


Tamsulosin [Flomax] 0.4 mg PO DAILY 05/30/14 [History]


lamiVUDine/Zidovudine [Combivir 150-300 MG] 1 tab PO BID 05/30/14 [History]


Alendronate Sodium [Fosamax] 70 mg PO ASDIRECTED 11/15/17 [History]


Amoxicillin 1 tab PO ASDIRECTED PRN 11/15/17 [History]


Baclofen 10 mg PO BID 11/15/17 [History]


Finasteride 1 tab PO DAILY 11/15/17 [History]


Potassium Citrate [Potassium Citrate ER] 15 meq PO BID 11/15/17 [History]


oxyCODONE 1 tab PO Q6H PRN 11/15/17 [History]


Dapsone 1 tab PO DAILY 02/15/18 [History]


amLODIPine [Norvasc] 2.5 mg PO BEDTIME 05/20/18 [History]











Past Medical History


HEENT History: Reports: Impaired Vision


Cardiovascular History: Reports: Hypertension


Gastrointestinal History: Reports: Other (See Below)


Other Gastrointestinal History: tumor 2009 removal in left groin, right groin 

tumor removal in 2014, right sided lymphedema


Genitourinary History: Reports: Prostate Disorder, Other (See Below)


Other Genitourinary History: cystogram of bladder 2017


Musculoskeletal History: Reports: Fracture


Other Musculoskeletal History: fracture right wrist and right hip 2015 screws 

in hip


Neurological History: Reports: Other (See Below)


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


Endocrine/Metabolic History: Reports: Hypothyroidism


Hematologic History: Reports: Anemia, Blood Transfusion(s)


Immunologic History: Reports: HIV


Oncologic (Cancer) History: Reports: Lung, Squamous Cell Carcinoma, Other (See 

Below)


Other Oncologic History: anal cancer


Dermatologic History: Reports: Cellulitis





- Infectious Disease History


Infectious Disease History: Reports: Chicken Pox, Measles





- Past Surgical History


Cardiovascular Surgical History: Reports: None


Respiratory Surgical History: Reports: Lung Resection


Other Respiratory Surgeries/Procedures: right lung resection 2008


GI Surgical History: Reports: Colonoscopy, Other (See Below)


Other GI Surgeries/Procedures: anal cancer in 2009 treated with radiation and 

chemo





Social & Family History





- Caffeine Use


Caffeine Use: Reports: Coffee, Soda





ED ROS GENERAL





- Review of Systems


Review Of Systems: See Below


Constitutional: Reports: No Symptoms


Respiratory: Reports: No Symptoms


Cardiovascular: Reports: No Symptoms


: Reports: Urinary Retention


Musculoskeletal: Reports: No Symptoms


Skin: Reports: No Symptoms


Neurological: Reports: No Symptoms





ED EXAM, RENAL/





- Physical Exam


Exam: See Below


Exam Limited By: No Limitations


General Appearance: Alert, WD/WN, No Apparent Distress


Eye Exam: Bilateral Eye: Normal Inspection


Ears: Hearing Grossly Normal


Nose: Normal Inspection


Throat/Mouth: Normal Voice, No Airway Compromise


Respiratory/Chest: No Respiratory Distress, No Accessory Muscle Use


GI/Abdominal: Distended (bladder)


Extremities: Normal Inspection


Neurological: Alert, Oriented, CN II-XII Intact, Normal Cognition, No Motor/

Sensory Deficits


Psychiatric: Normal Affect, Normal Mood





Course





- Vital Signs


Text/Narrative:: 





Catheter changed per nursing, 800+ cc of clear urine produced.


Last Recorded V/S: 


 Last Vital Signs











Temp  35.7 C   09/30/18 01:55


 


Pulse  76   09/30/18 01:55


 


Resp  18   09/30/18 01:55


 


BP  159/105 H  09/30/18 01:55


 


Pulse Ox  90 L  09/30/18 01:55














- Orders/Labs/Meds


Orders: 


 Active Orders 24 hr











 Category Date Time Status


 


 Guerrero Catheter Insertion [Insert Urinary Catheter] [OM. Care  09/30/18 02:15 

Ordered





 PC] Q24H   


 


 Urinary Catheter Assessment [RC] ASDIRECTED Care  09/30/18 02:09 Active











Labs: 


 Laboratory Tests











  09/30/18 Range/Units





  02:19 


 


Urine Color  Yellow  


 


Urine Appearance  Slightly cloudy  


 


Urine pH  8.0  (4.5-8.0)  


 


Ur Specific Gravity  1.005 L  (1.008-1.030)  


 


Urine Protein  30 H  (NEGATIVE)  mg/dL


 


Urine Glucose (UA)  Negative  (NEGATIVE)  mg/dL


 


Urine Ketones  Negative  (NEGATIVE)  mg/dL


 


Urine Occult Blood  Large  (NEGATIVE)  


 


Urine Nitrite  Negative  (NEGAITVE)  


 


Urine Bilirubin  Negative  (NEGATIVE)  


 


Urine Urobilinogen  Normal  (NORMAL)  mg/dL


 


Ur Leukocyte Esterase  Large  (NEGATIVE)  


 


Urine RBC  5-10 H  (0-5)  


 


Urine WBC  0-5  (0-5)  


 


Ur Epithelial Cells  Not seen  


 


Amorphous Sediment  Not seen  


 


Urine Bacteria  Rare  


 


Urine Mucus  Not seen  














Departure





- Departure


Time of Disposition: 02:41


Disposition: Home, Self-Care 01


Condition: Good


Clinical Impression: 


Obstructed Guerrero catheter


Qualifiers:


 Encounter type: initial encounter Qualified Code(s): T83.091A - Other 

mechanical complication of indwelling urethral catheter, initial encounter








- Discharge Information


*PRESCRIPTION DRUG MONITORING PROGRAM REVIEWED*: Not Applicable


*COPY OF PRESCRIPTION DRUG MONITORING REPORT IN PATIENT MOLLY: Not Applicable


Referrals: 


PCP,None [Primary Care Provider] - 


Forms:  ED Department Discharge


Additional Instructions: 


Continue present cares. Recheck as needed. 





- My Orders


Last 24 Hours: 


My Active Orders





09/30/18 02:09


Urinary Catheter Assessment [RC] ASDIRECTED 





09/30/18 02:15


Guerrero Catheter Insertion [Insert Urinary Catheter] [OM.PC] Q24H 














- Assessment/Plan


Last 24 Hours: 


My Active Orders





09/30/18 02:09


Urinary Catheter Assessment [RC] ASDIRECTED 





09/30/18 02:15


Guerrero Catheter Insertion [Insert Urinary Catheter] [OM.PC] Q24H

## 2019-02-11 ENCOUNTER — HOSPITAL ENCOUNTER (EMERGENCY)
Dept: HOSPITAL 11 - JP.ED | Age: 54
Discharge: HOME | End: 2019-02-11
Payer: MEDICARE

## 2019-02-11 DIAGNOSIS — N39.0: ICD-10-CM

## 2019-02-11 DIAGNOSIS — T83.098A: Primary | ICD-10-CM

## 2019-02-11 DIAGNOSIS — E03.9: ICD-10-CM

## 2019-02-11 DIAGNOSIS — Z88.8: ICD-10-CM

## 2019-02-11 DIAGNOSIS — Z79.899: ICD-10-CM

## 2019-02-11 DIAGNOSIS — I10: ICD-10-CM

## 2019-02-11 DIAGNOSIS — Z87.891: ICD-10-CM

## 2019-02-11 NOTE — EDM.PDOC
ED HPI GENERAL MEDICAL PROBLEM





- General


Chief Complaint: Genitourinary Problem


Stated Complaint: BLOCKED CATHITER


Time Seen by Provider: 02/11/19 07:14


Source of Information: Reports: Patient, Old Records, RN


History Limitations: Reports: No Limitations





- History of Present Illness


INITIAL COMMENTS - FREE TEXT/NARRATIVE: 





54 yo male with a chronic indwelling guerrero presents this morning due to the 

catheter being plugged. Has an appt with urology in [place] later today to have 

the catheter changed, but couldn't wait. Did have a UTI recently. Has HIV.


Onset: Today


Onset Date: 02/11/19


Duration: Hour(s):, Getting Worse


Location: Reports: Pelvis (bladder)


Quality: Reports: Pressure


Severity: Moderate


Improves with: Reports: Other (catheter change)


Worsens with: Reports: Other (prolonged catheter plugging)


Context: Reports: Other (chronic indwelling guerrero)


Associated Symptoms: Reports: No Other Symptoms


Treatments PTA: Reports: Other (see below) (none)


  ** Pelvic


Pain Score (Numeric/FACES): 10





- Related Data


 Allergies











Allergy/AdvReac Type Severity Reaction Status Date / Time


 


abacavir Allergy Severe Fever Verified 01/02/19 22:05











Home Meds: 


 Home Meds





Alpha-D-Galactosidase [Beano] 2 each PO ASDIRECTED PRN 05/30/14 [History]


Ascorbic Acid [Vitamin C] 500 mg PO DAILY 05/30/14 [History]


Aspirin [Adult Low Dose Aspirin EC] 81 mg PO DAILY 05/30/14 [History]


Ca Cmb No.1/Vit D3/B-6/FA/B12 [Vitamin D3 1,000 Unit] 1 each PO DAILY 05/30/14 [

History]


Calcium Carbonate/Vitamin D3 [Calcium 600 + Vit D Tablet] 1 each PO DAILY 05/30/ 14 [History]


Cyanocobalamin (Vitamin B-12) [Vitamin B-12] 500 mcg PO DAILY 05/30/14 [History]


Diphenoxylate HCl/Atropine [Lomotil] 1 - 2 tab PO TID PRN 05/30/14 [History]


Efavirenz [Sustiva] 600 mg PO BEDTIME 05/30/14 [History]


Fish Oil/Omega-3 Fatty Acids [Fish Oil 1,000 MG] 1 each PO DAILY 05/30/14 [

History]


Levothyroxine Sodium [Synthroid] 175 mcg PO DAILY 05/30/14 [History]


Psyllium with Sucrose [Metamucil] 1 each PO ASDIRECTED PRN 05/30/14 [History]


Raltegravir [Isentress] 400 mg PO BID 05/30/14 [History]


Solifenacin [Vesicare] 10 mg PO DAILY 05/30/14 [History]


Tamsulosin [Flomax] 0.4 mg PO DAILY 05/30/14 [History]


lamiVUDine/Zidovudine [Combivir 150-300 MG] 1 tab PO BID 05/30/14 [History]


Alendronate Sodium [Fosamax] 70 mg PO ASDIRECTED 11/15/17 [History]


Amoxicillin 1 tab PO ASDIRECTED PRN 11/15/17 [History]


Baclofen 10 mg PO BID 11/15/17 [History]


Finasteride 1 tab PO DAILY 11/15/17 [History]


Potassium Citrate [Potassium Citrate ER] 15 meq PO BID 11/15/17 [History]


oxyCODONE 1 tab PO Q6H PRN 11/15/17 [History]


Dapsone 1 tab PO DAILY 02/15/18 [History]


amLODIPine [Norvasc] 2.5 mg PO BEDTIME 05/20/18 [History]


Ciprofloxacin [Ciprofloxacin HCl] 250 mg PO BID #14 tablet 02/11/19 [Rx]











Past Medical History


HEENT History: Reports: Impaired Vision


Cardiovascular History: Reports: Hypertension


Gastrointestinal History: Reports: Other (See Below)


Other Gastrointestinal History: tumor 2009 removal in left groin, right groin 

tumor removal in 2014, right sided lymphedema


Genitourinary History: Reports: Prostate Disorder, Other (See Below)


Other Genitourinary History: cystogram of bladder 2017


Musculoskeletal History: Reports: Fracture


Other Musculoskeletal History: fracture right wrist and right hip 2015 screws 

in hip


Neurological History: Reports: Other (See Below)


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


Endocrine/Metabolic History: Reports: Hypothyroidism


Hematologic History: Reports: Anemia, Blood Transfusion(s)


Immunologic History: Reports: HIV


Oncologic (Cancer) History: Reports: Lung, Squamous Cell Carcinoma, Other (See 

Below)


Other Oncologic History: anal cancer


Dermatologic History: Reports: Cellulitis





- Infectious Disease History


Infectious Disease History: Reports: Chicken Pox





- Past Surgical History


Cardiovascular Surgical History: Reports: None


Respiratory Surgical History: Reports: Lung Resection


Other Respiratory Surgeries/Procedures: right lung resection 2008


GI Surgical History: Reports: Colonoscopy, Other (See Below)


Other GI Surgeries/Procedures: anal cancer in 2009 treated with radiation and 

chemo





Social & Family History





- Tobacco Use


Smoking Status *Q: Former Smoker


Used Tobacco, but Quit: Yes


Month/Year Tobacco Last Used: many years ago





- Caffeine Use


Caffeine Use: Reports: Coffee





- Recreational Drug Use


Recreational Drug Use: No





ED ROS GENERAL





- Review of Systems


Review Of Systems: See Below


Constitutional: Reports: No Symptoms


HEENT: Reports: No Symptoms


Respiratory: Reports: No Symptoms


Cardiovascular: Reports: No Symptoms


Endocrine: Reports: No Symptoms


GI/Abdominal: Reports: No Symptoms


: Reports: Urinary Retention (due to catheter obstruction)


Skin: Reports: No Symptoms





ED EXAM, RENAL/





- Physical Exam


Exam: See Below


Exam Limited By: No Limitations


General Appearance: Alert, WD/WN, No Apparent Distress


Eye Exam: Bilateral Eye: Normal Inspection


Ears: Hearing Grossly Normal


Nose: Normal Inspection, No Blood


Throat/Mouth: Normal Voice, No Airway Compromise


Head: Atraumatic, Normocephalic


Neck: Normal Inspection


Respiratory/Chest: No Respiratory Distress, No Accessory Muscle Use


Extremities: Normal Inspection


Neurological: Alert, Oriented, CN II-XII Intact, Normal Cognition, No Motor/

Sensory Deficits


Psychiatric: Normal Affect, Normal Mood


Skin Exam: Warm, Dry, Intact, Normal Color, No Rash





Course





- Vital Signs


Text/Narrative:: 





RN changed his catheter with good urine flow restored. Subjective relief 

achieved.


Last Recorded V/S: 


 Last Vital Signs











Temp  35.6 C   02/11/19 07:05


 


Pulse  99   02/11/19 07:05


 


Resp  20   02/11/19 07:05


 


BP  162/88 H  02/11/19 07:05


 


Pulse Ox  95   02/11/19 07:05














- Orders/Labs/Meds


Orders: 


 Active Orders 24 hr











 Category Date Time Status


 


 Guerrero Catheter Insertion [Insert Urinary Catheter] [OM. Care  02/11/19 07:15 

Ordered





 PC] Q24H   


 


 Urinary Catheter Assessment [RC] ASDIRECTED Care  02/11/19 07:13 Active


 


 CULTURE URINE [RM] Stat Lab  02/11/19 08:04 Ordered











Labs: 


 Laboratory Tests











  02/11/19 Range/Units





  07:37 


 


Urine Color  Yellow  


 


Urine Appearance  Turbid  


 


Urine pH  7.0  (4.5-8.0)  


 


Ur Specific Gravity  1.015  (1.008-1.030)  


 


Urine Protein  100 H  (NEGATIVE)  mg/dL


 


Urine Glucose (UA)  Normal  (NEGATIVE)  mg/dL


 


Urine Ketones  Negative  (NEGATIVE)  mg/dL


 


Urine Occult Blood  Large  (NEGATIVE)  


 


Urine Nitrite  Positive H  (NEGAITVE)  


 


Urine Bilirubin  Negative  (NEGATIVE)  


 


Urine Urobilinogen  Normal  (NORMAL)  mg/dL


 


Ur Leukocyte Esterase  Large  (NEGATIVE)  


 


Urine RBC  Semi-packed H  (0-5)  


 


Urine WBC  Packed H  (0-5)  


 


Ur Epithelial Cells  Few  


 


Amorphous Sediment  Not seen  


 


Urine Bacteria  Many  


 


Urine Mucus  Not seen  














Departure





- Departure


Time of Disposition: 08:05


Disposition: Home, Self-Care 01


Condition: Good


Clinical Impression: 


 Urinary catheter (Guerrero) change required, UTI, Urinary tract infectious disease








- Discharge Information


*PRESCRIPTION DRUG MONITORING PROGRAM REVIEWED*: No


*COPY OF PRESCRIPTION DRUG MONITORING REPORT IN PATIENT MOLLY: No


Prescriptions: 


Ciprofloxacin [Ciprofloxacin HCl] 250 mg PO BID #14 tablet


Referrals: 


Jacinto Fung MD [Primary Care Provider] - 


Forms:  ED Department Discharge


Additional Instructions: 


Take cipro as directed. Recheck with your provider in 3 days to review your 

culture results. 





- My Orders


Last 24 Hours: 


My Active Orders





02/11/19 07:13


Urinary Catheter Assessment [RC] ASDIRECTED 





02/11/19 07:15


Guerrero Catheter Insertion [Insert Urinary Catheter] [OM.PC] Q24H 





02/11/19 08:04


CULTURE URINE [RM] Stat 














- Assessment/Plan


Last 24 Hours: 


My Active Orders





02/11/19 07:13


Urinary Catheter Assessment [RC] ASDIRECTED 





02/11/19 07:15


Guerrero Catheter Insertion [Insert Urinary Catheter] [OM.PC] Q24H 





02/11/19 08:04


CULTURE URINE [RM] Stat

## 2019-03-04 ENCOUNTER — HOSPITAL ENCOUNTER (EMERGENCY)
Dept: HOSPITAL 11 - JP.ED | Age: 54
Discharge: HOME | End: 2019-03-04
Payer: MEDICARE

## 2019-03-04 DIAGNOSIS — E03.9: ICD-10-CM

## 2019-03-04 DIAGNOSIS — Z88.8: ICD-10-CM

## 2019-03-04 DIAGNOSIS — I10: ICD-10-CM

## 2019-03-04 DIAGNOSIS — T83.091A: Primary | ICD-10-CM

## 2019-03-04 DIAGNOSIS — Z86.2: ICD-10-CM

## 2019-03-04 DIAGNOSIS — Z79.82: ICD-10-CM

## 2019-03-04 DIAGNOSIS — Z79.899: ICD-10-CM

## 2019-03-04 DIAGNOSIS — Z87.891: ICD-10-CM

## 2019-03-04 PROCEDURE — 51702 INSERT TEMP BLADDER CATH: CPT

## 2019-03-04 PROCEDURE — 87086 URINE CULTURE/COLONY COUNT: CPT

## 2019-03-04 PROCEDURE — 81001 URINALYSIS AUTO W/SCOPE: CPT

## 2019-03-04 PROCEDURE — 99283 EMERGENCY DEPT VISIT LOW MDM: CPT

## 2019-03-04 NOTE — EDM.PDOC
ED HPI GENERAL MEDICAL PROBLEM





- General


Chief Complaint: Genitourinary Problem


Stated Complaint: CATHETER IS BLOCKED


Time Seen by Provider: 03/04/19 22:20


Source of Information: Reports: Patient, Old Records, RN


History Limitations: Reports: No Limitations





- History of Present Illness


INITIAL COMMENTS - FREE TEXT/NARRATIVE: 





52 yo male with an indwelling guerrero presents with a plugged catheter. Is in a 

fair amount of pain from the pressure build up. No fever. Just finished 

doxycycline for a UTI. 


Onset: Today


Onset Date: 03/04/19


Onset Time: 18:30


Duration: Hour(s):


Location: Reports: Pelvis (bladder)


Quality: Reports: Pressure


Severity: Severe


Improves with: Reports: None


Worsens with: Reports: Other (time)


Context: Reports: Other (See HPI)


Treatments PTA: Reports: Other (see below) (none)


  ** Bladder


Pain Score (Numeric/FACES): 10





- Related Data


 Allergies











Allergy/AdvReac Type Severity Reaction Status Date / Time


 


abacavir Allergy Severe Fever Verified 03/04/19 22:18











Home Meds: 


 Home Meds





Alpha-D-Galactosidase [Beano] 2 each PO ASDIRECTED PRN 05/30/14 [History]


Ascorbic Acid [Vitamin C] 500 mg PO DAILY 05/30/14 [History]


Aspirin [Adult Low Dose Aspirin EC] 81 mg PO DAILY 05/30/14 [History]


Ca Cmb No.1/Vit D3/B-6/FA/B12 [Vitamin D3 1,000 Unit] 1 each PO DAILY 05/30/14 [

History]


Calcium Carbonate/Vitamin D3 [Calcium 600 + Vit D Tablet] 1 each PO DAILY 05/30/ 14 [History]


Cyanocobalamin (Vitamin B-12) [Vitamin B-12] 500 mcg PO DAILY 05/30/14 [History]


Diphenoxylate HCl/Atropine [Lomotil] 1 - 2 tab PO TID PRN 05/30/14 [History]


Efavirenz [Sustiva] 600 mg PO BEDTIME 05/30/14 [History]


Fish Oil/Omega-3 Fatty Acids [Fish Oil 1,000 MG] 1 each PO DAILY 05/30/14 [

History]


Levothyroxine Sodium [Synthroid] 175 mcg PO DAILY 05/30/14 [History]


Psyllium with Sucrose [Metamucil] 1 each PO ASDIRECTED PRN 05/30/14 [History]


Raltegravir [Isentress] 400 mg PO BID 05/30/14 [History]


Solifenacin [Vesicare] 10 mg PO DAILY 05/30/14 [History]


Tamsulosin [Flomax] 0.4 mg PO DAILY 05/30/14 [History]


lamiVUDine/Zidovudine [Combivir 150-300 MG] 1 tab PO BID 05/30/14 [History]


Alendronate Sodium [Fosamax] 70 mg PO ASDIRECTED 11/15/17 [History]


Amoxicillin 1 tab PO ASDIRECTED PRN 11/15/17 [History]


Baclofen 10 mg PO BID 11/15/17 [History]


Finasteride 1 tab PO DAILY 11/15/17 [History]


Potassium Citrate [Potassium Citrate ER] 15 meq PO BID 11/15/17 [History]


oxyCODONE 1 tab PO Q6H PRN 11/15/17 [History]


Dapsone 1 tab PO DAILY 02/15/18 [History]


amLODIPine [Norvasc] 2.5 mg PO BEDTIME 05/20/18 [History]


Ciprofloxacin [Ciprofloxacin HCl] 250 mg PO BID #14 tablet 02/11/19 [Rx]


Doxycycline [Vibramycin] 100 mg PO BID #7 cap 03/04/19 [Rx]











Past Medical History


HEENT History: Reports: Impaired Vision


Cardiovascular History: Reports: Hypertension


Gastrointestinal History: Reports: Other (See Below)


Other Gastrointestinal History: tumor 2009 removal in left groin, right groin 

tumor removal in 2014, right sided lymphedema


Genitourinary History: Reports: Prostate Disorder, Other (See Below)


Other Genitourinary History: cystogram of bladder 2017


Musculoskeletal History: Reports: Fracture


Other Musculoskeletal History: fracture right wrist and right hip 2015 screws 

in hip


Neurological History: Reports: Other (See Below)


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


Endocrine/Metabolic History: Reports: Hypothyroidism


Hematologic History: Reports: Anemia, Blood Transfusion(s)


Immunologic History: Reports: HIV


Oncologic (Cancer) History: Reports: Lung, Squamous Cell Carcinoma, Other (See 

Below)


Other Oncologic History: anal cancer


Dermatologic History: Reports: Cellulitis





- Infectious Disease History


Infectious Disease History: Reports: Chicken Pox





- Past Surgical History


Respiratory Surgical History: Reports: Lung Resection


Other Respiratory Surgeries/Procedures: right lung resection 2008


GI Surgical History: Reports: Colonoscopy, Other (See Below)


Other GI Surgeries/Procedures: anal cancer in 2009 treated with radiation and 

chemo





Social & Family History





- Tobacco Use


Smoking Status *Q: Former Smoker


Used Tobacco, but Quit: Yes


Month/Year Tobacco Last Used: 10/2008





- Caffeine Use


Caffeine Use: Reports: Coffee





- Recreational Drug Use


Recreational Drug Use: No





ED ROS GENERAL





- Review of Systems


Review Of Systems: See Below


Constitutional: Reports: No Symptoms


HEENT: Reports: No Symptoms


Respiratory: Reports: No Symptoms


Cardiovascular: Reports: No Symptoms


Endocrine: Reports: No Symptoms


GI/Abdominal: Reports: No Symptoms


: Reports: Urinary Retention, Other (bladder pressure)


Musculoskeletal: Reports: No Symptoms


Skin: Reports: No Symptoms


Neurological: Reports: No Symptoms





ED EXAM, RENAL/





- Physical Exam


Exam: See Below


Exam Limited By: No Limitations


General Appearance: Alert, WD/WN, Mild Distress (Relieved fully after catheter 

change.)


Eye Exam: Bilateral Eye: Normal Inspection


GI/Abdominal: No Distention (of bladder only), Tender, Other (suprapubic 

fullness).  No: Guarding, Rigid, Rebound


 (Male) Exam: Suprapubic Fullness


Neurological: Alert, Oriented, CN II-XII Intact, Normal Cognition, No Motor/

Sensory Deficits


Psychiatric: Normal Affect, Normal Mood


Skin Exam: Warm, Dry, Intact, Normal Color, No Rash





Course





- Vital Signs


Last Recorded V/S: 


 Last Vital Signs











Temp  35.8 C   03/04/19 22:19


 


Pulse  92   03/04/19 22:19


 


Resp  18   03/04/19 22:19


 


BP  155/98 H  03/04/19 22:33


 


Pulse Ox  94 L  03/04/19 22:19














- Orders/Labs/Meds


Orders: 


 Active Orders 24 hr











 Category Date Time Status


 


 Guerrero Catheter Insertion [Insert Urinary Catheter] [OM. Care  03/04/19 22:30 

Ordered





 PC] Q24H   


 


 Urinary Catheter Assessment [RC] ASDIRECTED Care  03/04/19 22:19 Active


 


 CULTURE URINE [RM] Stat Lab  03/04/19 22:36 Ordered


 


 UA W/MICROSCOPIC [URIN] Stat Lab  03/04/19 22:24 Results











Labs: 


 Laboratory Tests











  03/04/19 Range/Units





  22:24 


 


Urine Color  Yellow  


 


Urine Appearance  Slightly cloudy  


 


Urine pH  7.0  (4.5-8.0)  


 


Ur Specific Gravity  1.010  (1.008-1.030)  


 


Urine Protein  Trace  (NEGATIVE)  mg/dL


 


Urine Glucose (UA)  Normal  (NEGATIVE)  mg/dL


 


Urine Ketones  Negative  (NEGATIVE)  mg/dL


 


Urine Occult Blood  Large  (NEGATIVE)  


 


Urine Nitrite  Positive H  (NEGAITVE)  


 


Urine Bilirubin  Negative  (NEGATIVE)  


 


Urine Urobilinogen  Normal  (NORMAL)  mg/dL


 


Ur Leukocyte Esterase  Large  (NEGATIVE)  











Meds: 


Medications














Discontinued Medications














Generic Name Dose Route Start Last Admin





  Trade Name Dilia  PRN Reason Stop Dose Admin


 


Doxycycline Hyclate  100 mg  03/04/19 22:35  





  Vibramycin  PO  03/04/19 22:36  





  ONETIME ONE   





     





     





     





     














Departure





- Departure


Time of Disposition: 22:45


Disposition: Home, Self-Care 01


Condition: Good


Clinical Impression: 


Obstructed Guerrero catheter


Qualifiers:


 Encounter type: initial encounter Qualified Code(s): T83.091A - Other 

mechanical complication of indwelling urethral catheter, initial encounter








- Discharge Information


*PRESCRIPTION DRUG MONITORING PROGRAM REVIEWED*: No


*COPY OF PRESCRIPTION DRUG MONITORING REPORT IN PATIENT MOLLY: No


Prescriptions: 


Doxycycline [Vibramycin] 100 mg PO BID #7 cap


Instructions:  Indwelling Urinary Catheter Care, Adult


Referrals: 


PCP,None [Primary Care Provider] - 


Forms:  ED Department Discharge


Additional Instructions: 


Take doxycycline every 12 hrs. Call your doctor in 3 days to follow up on your 

culture. 





- My Orders


Last 24 Hours: 


My Active Orders





03/04/19 22:19


Urinary Catheter Assessment [RC] ASDIRECTED 





03/04/19 22:24


UA W/MICROSCOPIC [URIN] Stat 





03/04/19 22:30


Guerrero Catheter Insertion [Insert Urinary Catheter] [OM.PC] Q24H 





03/04/19 22:36


CULTURE URINE [RM] Stat 














- Assessment/Plan


Last 24 Hours: 


My Active Orders





03/04/19 22:19


Urinary Catheter Assessment [RC] ASDIRECTED 





03/04/19 22:24


UA W/MICROSCOPIC [URIN] Stat 





03/04/19 22:30


Guerrero Catheter Insertion [Insert Urinary Catheter] [OM.PC] Q24H 





03/04/19 22:36


CULTURE URINE [RM] Stat

## 2019-03-11 ENCOUNTER — HOSPITAL ENCOUNTER (EMERGENCY)
Dept: HOSPITAL 11 - JP.ED | Age: 54
Discharge: HOME | End: 2019-03-11
Payer: MEDICARE

## 2019-03-11 DIAGNOSIS — Z79.899: ICD-10-CM

## 2019-03-11 DIAGNOSIS — Z79.82: ICD-10-CM

## 2019-03-11 DIAGNOSIS — Z88.8: ICD-10-CM

## 2019-03-11 DIAGNOSIS — Z86.2: ICD-10-CM

## 2019-03-11 DIAGNOSIS — E03.9: ICD-10-CM

## 2019-03-11 DIAGNOSIS — T83.9XXA: Primary | ICD-10-CM

## 2019-03-11 DIAGNOSIS — I10: ICD-10-CM

## 2019-03-11 RX ADMIN — LIDOCAINE HYDROCHLORIDE ONE ML: 20 JELLY TOPICAL at 06:01

## 2019-03-11 NOTE — EDM.PDOC
ED HPI GENERAL MEDICAL PROBLEM





- General


Chief Complaint: Genitourinary Problem


Stated Complaint: CATH ISSUES


Time Seen by Provider: 03/11/19 06:40


Source of Information: Reports: Patient





- History of Present Illness


INITIAL COMMENTS - FREE TEXT/NARRATIVE: 


54-year-old with history of chronic indwelling Guerrero presents with concerns of 

clogged Guerrero catheter.


This is a frequent concern for him due to excessive sediment buildup.


He is prescribed a medication to prevent this by his urologist but cannot 

afford it.


He presented tonight with suprapubic discomfort consistent with when his 

urinary catheter is clogged





Treatments PTA: Reports: Other (see below)


Other Treatments PTA: none


  ** Bladder


Pain Score (Numeric/FACES): 12





- Related Data


 Allergies











Allergy/AdvReac Type Severity Reaction Status Date / Time


 


abacavir Allergy Severe Fever Verified 03/04/19 22:18











Home Meds: 


 Home Meds





Alpha-D-Galactosidase [Beano] 2 each PO ASDIRECTED PRN 05/30/14 [History]


Ascorbic Acid [Vitamin C] 500 mg PO DAILY 05/30/14 [History]


Aspirin [Adult Low Dose Aspirin EC] 81 mg PO DAILY 05/30/14 [History]


Ca Cmb No.1/Vit D3/B-6/FA/B12 [Vitamin D3 1,000 Unit] 1 each PO DAILY 05/30/14 [

History]


Calcium Carbonate/Vitamin D3 [Calcium 600 + Vit D Tablet] 1 each PO DAILY 05/30/ 14 [History]


Cyanocobalamin (Vitamin B-12) [Vitamin B-12] 500 mcg PO DAILY 05/30/14 [History]


Diphenoxylate HCl/Atropine [Lomotil] 1 - 2 tab PO TID PRN 05/30/14 [History]


Efavirenz [Sustiva] 600 mg PO BEDTIME 05/30/14 [History]


Fish Oil/Omega-3 Fatty Acids [Fish Oil 1,000 MG] 1 each PO DAILY 05/30/14 [

History]


Levothyroxine Sodium [Synthroid] 175 mcg PO DAILY 05/30/14 [History]


Psyllium with Sucrose [Metamucil] 1 each PO ASDIRECTED PRN 05/30/14 [History]


Raltegravir [Isentress] 400 mg PO BID 05/30/14 [History]


Solifenacin [Vesicare] 10 mg PO DAILY 05/30/14 [History]


Tamsulosin [Flomax] 0.4 mg PO DAILY 05/30/14 [History]


lamiVUDine/Zidovudine [Combivir 150-300 MG] 1 tab PO BID 05/30/14 [History]


Alendronate Sodium [Fosamax] 70 mg PO ASDIRECTED 11/15/17 [History]


Amoxicillin 1 tab PO ASDIRECTED PRN 11/15/17 [History]


Baclofen 10 mg PO BID 11/15/17 [History]


Finasteride 1 tab PO DAILY 11/15/17 [History]


Potassium Citrate [Potassium Citrate ER] 15 meq PO BID 11/15/17 [History]


oxyCODONE 1 tab PO Q6H PRN 11/15/17 [History]


Dapsone 1 tab PO DAILY 02/15/18 [History]


amLODIPine [Norvasc] 2.5 mg PO BEDTIME 05/20/18 [History]


Ciprofloxacin [Ciprofloxacin HCl] 250 mg PO BID #14 tablet 02/11/19 [Rx]


Doxycycline [Vibramycin] 100 mg PO BID #7 cap 03/04/19 [Rx]











Past Medical History


HEENT History: Reports: Impaired Vision


Cardiovascular History: Reports: Hypertension


Gastrointestinal History: Reports: Other (See Below)


Other Gastrointestinal History: tumor 2009 removal in left groin, right groin 

tumor removal in 2014, right sided lymphedema


Genitourinary History: Reports: Prostate Disorder, Other (See Below)


Other Genitourinary History: cystogram of bladder 2017


Musculoskeletal History: Reports: Fracture


Other Musculoskeletal History: fracture right wrist and right hip 2015 screws 

in hip


Neurological History: Reports: Other (See Below)


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


Endocrine/Metabolic History: Reports: Hypothyroidism


Hematologic History: Reports: Anemia, Blood Transfusion(s)


Immunologic History: Reports: HIV


Oncologic (Cancer) History: Reports: Lung, Squamous Cell Carcinoma, Other (See 

Below)


Other Oncologic History: anal cancer


Dermatologic History: Reports: Cellulitis





- Infectious Disease History


Infectious Disease History: Reports: Chicken Pox, HIV-Human Immunodeficiency 

Virus, Measles





- Past Surgical History


Respiratory Surgical History: Reports: Lung Resection


Other Respiratory Surgeries/Procedures: right lung resection 2008


GI Surgical History: Reports: Colonoscopy, Other (See Below)


Other GI Surgeries/Procedures: anal cancer in 2009 treated with radiation and 

chemo





Social & Family History





- Tobacco Use


Smoking Status *Q: Former Smoker


Used Tobacco, but Quit: Yes


Month/Year Tobacco Last Used: unknown





- Caffeine Use


Caffeine Use: Reports: Coffee





- Recreational Drug Use


Recreational Drug Use: No





ED ROS GENERAL





- Review of Systems


Review Of Systems: See Below


Constitutional: Reports: No Symptoms


HEENT: Reports: No Symptoms


Respiratory: Reports: No Symptoms


Cardiovascular: Reports: No Symptoms


Endocrine: Reports: No Symptoms


GI/Abdominal: Reports: No Symptoms


: Reports: Pain, Urinary Retention


Musculoskeletal: Reports: No Symptoms


Skin: Reports: No Symptoms


Neurological: Reports: No Symptoms


Psychiatric: Reports: No Symptoms


Hematologic/Lymphatic: Reports: No Symptoms


Immunologic: Reports: No Symptoms





ED EXAM, RENAL/





- Physical Exam


Exam: See Below


Exam Limited By: No Limitations


General Appearance: Alert


Nose: Normal Inspection


Head: Atraumatic, Normocephalic


Respiratory/Chest: No Respiratory Distress


Cardiovascular: Regular Rate, Rhythm


GI/Abdominal: Soft, No Distention


 (Male) Exam: Other (guerrero catheter in place)


Back Exam: Normal Inspection


Extremities: Normal Inspection


Neurological: Alert, Oriented


Psychiatric: Normal Affect, Normal Mood


Skin Exam: Warm, Dry





Course





- Vital Signs


Last Recorded V/S: 


 Last Vital Signs











Temp  35.9 C   03/11/19 05:49


 


Pulse  88   03/11/19 05:49


 


Resp  20   03/11/19 05:49


 


BP  185/117 H  03/11/19 05:49


 


Pulse Ox  93 L  03/11/19 05:49














- Orders/Labs/Meds


Orders: 


 Active Orders 24 hr











 Category Date Time Status


 


 Guerrero Catheter Insertion [Insert Urinary Catheter] [OM. Care  03/11/19 06:00 

Ordered





 PC] Q24H   


 


 Urinary Catheter Assessment [RC] ASDIRECTED Care  03/11/19 06:00 Active











Meds: 


Medications














Discontinued Medications














Generic Name Dose Route Start Last Admin





  Trade Name Freq  PRN Reason Stop Dose Admin


 


Lidocaine HCl  10 ml  03/11/19 05:27  03/11/19 06:01





  Xylocaine 2% Jelly  MUCMEM  03/11/19 05:28  10 ml





  ONETIME ONE   Administration





     





     





     





     














- Re-Assessments/Exams


Free Text/Narrative Re-Assessment/Exam: 


53 yo with chronic indwelling guerrero presents with concerns of clogged guerrero.


This is frequent problem for him due to sediment buildup


Replaced per usual routine with large volume return of urine.


No new symptoms to suggest infection or necessitate UA


03/11/19 07:01








Departure





- Departure


Time of Disposition: 06:55


Disposition: Home, Self-Care 01


Clinical Impression: 


Guerrero catheter problem


Qualifiers:


 Encounter type: initial encounter Qualified Code(s): T83.9XXA - Unspecified 

complication of genitourinary prosthetic device, implant and graft, initial 

encounter








- Discharge Information


*PRESCRIPTION DRUG MONITORING PROGRAM REVIEWED*: Not Applicable


*COPY OF PRESCRIPTION DRUG MONITORING REPORT IN PATIENT MOLLY: Not Applicable


Referrals: 


PCP,None [Primary Care Provider] - 


Forms:  ED Department Discharge


Additional Instructions: 


Please follow up with your urologist as planned


Return to the ER for recurrent issues. 





- My Orders


Last 24 Hours: 


My Active Orders





03/11/19 06:00


Guerrero Catheter Insertion [Insert Urinary Catheter] [OM.PC] Q24H 


Urinary Catheter Assessment [RC] ASDIRECTED 














- Assessment/Plan


Last 24 Hours: 


My Active Orders





03/11/19 06:00


Guerreor Catheter Insertion [Insert Urinary Catheter] [OM.PC] Q24H 


Urinary Catheter Assessment [RC] ASDIRECTED

## 2019-04-03 ENCOUNTER — HOSPITAL ENCOUNTER (EMERGENCY)
Dept: HOSPITAL 11 - JP.ED | Age: 54
Discharge: HOME | End: 2019-04-03
Payer: MEDICARE

## 2019-04-03 DIAGNOSIS — T83.511A: ICD-10-CM

## 2019-04-03 DIAGNOSIS — I10: ICD-10-CM

## 2019-04-03 DIAGNOSIS — T83.091A: Primary | ICD-10-CM

## 2019-04-03 DIAGNOSIS — N39.0: ICD-10-CM

## 2019-04-03 DIAGNOSIS — Z79.82: ICD-10-CM

## 2019-04-03 DIAGNOSIS — Z79.899: ICD-10-CM

## 2019-04-03 DIAGNOSIS — Z87.891: ICD-10-CM

## 2019-04-03 PROCEDURE — 99283 EMERGENCY DEPT VISIT LOW MDM: CPT

## 2019-04-03 PROCEDURE — 81001 URINALYSIS AUTO W/SCOPE: CPT

## 2019-04-03 PROCEDURE — 51702 INSERT TEMP BLADDER CATH: CPT

## 2019-04-03 NOTE — EDM.PDOC
ED HPI GENERAL MEDICAL PROBLEM





- General


Chief Complaint: Genitourinary Problem


Stated Complaint: PROBLEM WITH CATH


Time Seen by Provider: 04/03/19 21:51


Source of Information: Reports: Patient, Old Records, RN


History Limitations: Reports: No Limitations





- History of Present Illness


INITIAL COMMENTS - FREE TEXT/NARRATIVE: 





53 yo male with a chronically indwelling guerrero catheter presents with plugging 

of his catheter. Has been here multiple times for this same problem. Has a lot 

of sediment in his urine as well as recurrent UTI's. Finished his doxycycline 

for the last UTI a couple weeks ago and never followed up to see if his UTI was 

resolved. No recent fevers. 





His last urine culture from 3/4/2019 showed growth of P. Rettgeri resistant to 

doxycycline, but sensitive to Cipro. 


Onset: Today


Onset Date: 04/03/19


Onset Time: 20:00


Duration: Hour(s):


Location: Reports: Pelvis (bladder pressure)


Quality: Reports: Pressure


Severity: Moderate


Improves with: Reports: Other (catheter unplugging)


Worsens with: Reports: Other (time)


Context: Reports: Other (see HPI)


Associated Symptoms: Reports: No Other Symptoms


Treatments PTA: Reports: Other (see below) (none)





- Related Data


 Allergies











Allergy/AdvReac Type Severity Reaction Status Date / Time


 


abacavir Allergy Severe Fever Verified 04/03/19 21:21











Home Meds: 


 Home Meds





Alpha-D-Galactosidase [Beano] 2 each PO ASDIRECTED PRN 05/30/14 [History]


Ascorbic Acid [Vitamin C] 500 mg PO DAILY 05/30/14 [History]


Aspirin [Adult Low Dose Aspirin EC] 81 mg PO DAILY 05/30/14 [History]


Ca Cmb No.1/Vit D3/B-6/FA/B12 [Vitamin D3 1,000 Unit] 1 each PO DAILY 05/30/14 [

History]


Calcium Carbonate/Vitamin D3 [Calcium 600 + Vit D Tablet] 1 each PO DAILY 05/30/ 14 [History]


Cyanocobalamin (Vitamin B-12) [Vitamin B-12] 500 mcg PO DAILY 05/30/14 [History]


Diphenoxylate HCl/Atropine [Lomotil] 1 - 2 tab PO TID PRN 05/30/14 [History]


Efavirenz [Sustiva] 600 mg PO BEDTIME 05/30/14 [History]


Fish Oil/Omega-3 Fatty Acids [Fish Oil 1,000 MG] 1 each PO DAILY 05/30/14 [

History]


Levothyroxine Sodium [Synthroid] 175 mcg PO DAILY 05/30/14 [History]


Psyllium with Sucrose [Metamucil] 1 each PO ASDIRECTED PRN 05/30/14 [History]


Raltegravir [Isentress] 400 mg PO BID 05/30/14 [History]


Solifenacin [Vesicare] 10 mg PO DAILY 05/30/14 [History]


Tamsulosin [Flomax] 0.4 mg PO DAILY 05/30/14 [History]


lamiVUDine/Zidovudine [Combivir 150-300 MG] 1 tab PO BID 05/30/14 [History]


Alendronate Sodium [Fosamax] 70 mg PO ASDIRECTED 11/15/17 [History]


Amoxicillin 1 tab PO ASDIRECTED PRN 11/15/17 [History]


Baclofen 10 mg PO BID 11/15/17 [History]


Finasteride 1 tab PO DAILY 11/15/17 [History]


Potassium Citrate [Potassium Citrate ER] 15 meq PO BID 11/15/17 [History]


oxyCODONE 1 tab PO Q6H PRN 11/15/17 [History]


Dapsone 1 tab PO DAILY 02/15/18 [History]


amLODIPine [Norvasc] 2.5 mg PO BEDTIME 05/20/18 [History]


Ciprofloxacin [Ciprofloxacin HCl] 250 mg PO BID #14 tablet 02/11/19 [Rx]


Doxycycline [Vibramycin] 100 mg PO BID #7 cap 03/04/19 [Rx]











Past Medical History


HEENT History: Reports: Impaired Vision


Cardiovascular History: Reports: Hypertension


Gastrointestinal History: Reports: Other (See Below)


Other Gastrointestinal History: tumor 2009 removal in left groin, right groin 

tumor removal in 2014, right sided lymphedema


Genitourinary History: Reports: Prostate Disorder, Other (See Below)


Other Genitourinary History: cystogram of bladder 2017


Musculoskeletal History: Reports: Fracture


Other Musculoskeletal History: right hip 2015 screws in hip


Neurological History: Reports: Other (See Below)


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


Endocrine/Metabolic History: Reports: Hypothyroidism


Hematologic History: Reports: Anemia, Blood Transfusion(s)


Immunologic History: Reports: HIV


Oncologic (Cancer) History: Reports: Lung, Squamous Cell Carcinoma, Other (See 

Below)


Other Oncologic History: anal cancer


Dermatologic History: Reports: Cellulitis





- Infectious Disease History


Infectious Disease History: Reports: Chicken Pox, HIV-Human Immunodeficiency 

Virus, Measles





- Past Surgical History


Respiratory Surgical History: Reports: Lung Resection


Other Respiratory Surgeries/Procedures: right lung resection 2008


GI Surgical History: Reports: Colonoscopy, Other (See Below)


Other GI Surgeries/Procedures: anal cancer in 2009 treated with radiation and 

chemo





Social & Family History





- Tobacco Use


Smoking Status *Q: Former Smoker


Used Tobacco, but Quit: Yes


Month/Year Tobacco Last Used: 2008





- Caffeine Use


Caffeine Use: Reports: Coffee





ED ROS GENERAL





- Review of Systems


Review Of Systems: See Below


Constitutional: Reports: No Symptoms


HEENT: Reports: No Symptoms


Respiratory: Reports: No Symptoms


Cardiovascular: Reports: No Symptoms


GI/Abdominal: Reports: No Symptoms


: Reports: Other (guerrero plugged)


Skin: Reports: No Symptoms


Neurological: Reports: No Symptoms





ED EXAM, RENAL/





- Physical Exam


Exam: See Below


Exam Limited By: No Limitations


General Appearance: Alert, WD/WN, No Apparent Distress


GI/Abdominal: Other (suprapubic tenderness. Catheter not flowing.)


Extremities: Normal Inspection


Neurological: Alert, Oriented, CN II-XII Intact, Normal Cognition, No Motor/

Sensory Deficits


Psychiatric: Normal Affect, Normal Mood


Skin Exam: Warm, Dry, Intact, Normal Color, No Rash


Lymphatic: No Adenopathy





Course





- Vital Signs


Last Recorded V/S: 


 Last Vital Signs











Temp  36.5 C   04/03/19 21:25


 


Pulse  85   04/03/19 21:25


 


Resp  16   04/03/19 21:25


 


BP  143/99 H  04/03/19 21:25


 


Pulse Ox  92 L  04/03/19 21:25














- Orders/Labs/Meds


Labs: 


 Laboratory Tests











  04/03/19 Range/Units





  21:53 


 


Urine Color  Orange  


 


Urine Appearance  Cloudy  


 


Urine pH  5.0  (4.5-8.0)  


 


Ur Specific Gravity  1.020  (1.008-1.030)  


 


Urine Protein  30 H  (NEGATIVE)  mg/dL


 


Urine Glucose (UA)  Normal  (NEGATIVE)  mg/dL


 


Urine Ketones  Negative  (NEGATIVE)  mg/dL


 


Urine Occult Blood  Large  (NEGATIVE)  


 


Urine Nitrite  Positive H  (NEGAITVE)  


 


Urine Bilirubin  Negative  (NEGATIVE)  


 


Urine Urobilinogen  Normal  (NORMAL)  mg/dL


 


Ur Leukocyte Esterase  Large  (NEGATIVE)  


 


Urine RBC  20-30 H  (0-5)  


 


Urine WBC  Packed H  (0-5)  


 


Ur Epithelial Cells  Few  


 


Amorphous Sediment  Few  


 


Urine Bacteria  Moderate  


 


Urine Mucus  Few  














Departure





- Departure


Time of Disposition: 22:30


Disposition: Home, Self-Care 01


Condition: Fair


Clinical Impression: 


Obstructed Guerrero catheter


Qualifiers:


 Encounter type: initial encounter Qualified Code(s): T83.091A - Other 

mechanical complication of indwelling urethral catheter, initial encounter





UTI (urinary tract infection)


Qualifiers:


 Urinary tract infection type: catheter-associated UTI Indwelling urinary 

catheter type: indwelling urethral catheter Encounter type: sequela Qualified 

Code(s): T83.511S - Infection and inflammatory reaction due to indwelling 

urethral catheter, sequela; N39.0 - Urinary tract infection, site not specified








- Discharge Information


*PRESCRIPTION DRUG MONITORING PROGRAM REVIEWED*: No


*COPY OF PRESCRIPTION DRUG MONITORING REPORT IN PATIENT MOLLY: No


Instructions:  Catheter-Associated Urinary Tract Infection FAQs - [facility]


Referrals: 


Jacinto Fung MD [Primary Care Provider] - 


Forms:  ED Department Discharge


Additional Instructions: 


Take cipro every 12 hrs until gone. Recheck with your doctor in 7-8 days, 

sooner if worse.

## 2019-08-28 NOTE — EDM.PDOC
ED HPI GENERAL MEDICAL PROBLEM





- General


Chief Complaint: Lower Extremity Injury/Pain


Stated Complaint: RIGHT LEG/CONCERN ABOUT CELLULITIS


Time Seen by Provider: 08/28/19 14:05


Source of Information: Reports: Patient, Old Records


History Limitations: Reports: No Limitations





- History of Present Illness


INITIAL COMMENTS - FREE TEXT/NARRATIVE: 





53 yo male with an indwelling guerrero catheter and who is on treatment for HIV 

infection presents with onset late yesterday of increased redness and warmth in 

the R leg from below the knee to the ankle. No fever or chills. His provider is 

in [place] and he lives locally so uses the ER here for much of his care. Leg 

is a little swollen as well. 


Onset: Gradual


Onset Date: 08/27/19


Duration: Day(s): (1), Getting Worse


Location: Reports: Lower Extremity, Right


Quality: Reports: Other (no pain)


Severity: Moderate


Improves with: Reports: None


Worsens with: Reports: Other (time)


Context: Reports: Other (see HPI)


Associated Symptoms: Reports: No Other Symptoms


Treatments PTA: Reports: Other (see below) (none)





- Related Data


 Allergies











Allergy/AdvReac Type Severity Reaction Status Date / Time


 


abacavir Allergy Severe Fever Verified 08/28/19 13:52











Home Meds: 


 Home Meds





Alpha-D-Galactosidase [Beano] 2 each PO ASDIRECTED PRN 05/30/14 [History]


Ascorbic Acid [Vitamin C] 500 mg PO DAILY 05/30/14 [History]


Aspirin [Adult Low Dose Aspirin EC] 81 mg PO DAILY 05/30/14 [History]


Ca Cmb No.1/Vit D3/B-6/FA/B12 [Vitamin D3 1,000 Unit] 1 each PO DAILY 05/30/14 [

History]


Calcium Carbonate/Vitamin D3 [Calcium 600 + Vit D Tablet] 1 each PO DAILY 05/30/ 14 [History]


Cyanocobalamin (Vitamin B-12) [Vitamin B-12] 500 mcg PO DAILY 05/30/14 [History]


Diphenoxylate HCl/Atropine [Lomotil] 1 - 2 tab PO TID PRN 05/30/14 [History]


Efavirenz [Sustiva] 600 mg PO BEDTIME 05/30/14 [History]


Fish Oil/Omega-3 Fatty Acids [Fish Oil 1,000 MG] 1 each PO DAILY 05/30/14 [

History]


Levothyroxine Sodium [Synthroid] 175 mcg PO DAILY 05/30/14 [History]


Psyllium with Sucrose [Metamucil] 1 each PO ASDIRECTED PRN 05/30/14 [History]


Raltegravir [Isentress] 400 mg PO BID 05/30/14 [History]


Solifenacin [Vesicare] 10 mg PO DAILY 05/30/14 [History]


Tamsulosin [Flomax] 0.4 mg PO DAILY 05/30/14 [History]


lamiVUDine/Zidovudine [Combivir 150-300 MG] 1 tab PO BID 05/30/14 [History]


Alendronate Sodium [Fosamax] 70 mg PO ASDIRECTED 11/15/17 [History]


Amoxicillin 1 tab PO ASDIRECTED PRN 11/15/17 [History]


Baclofen 10 mg PO BID 11/15/17 [History]


Finasteride 1 tab PO DAILY 11/15/17 [History]


oxyCODONE 1 tab PO Q6H PRN 11/15/17 [History]


Dapsone 1 tab PO DAILY 02/15/18 [History]


amLODIPine [Norvasc] 2.5 mg PO BEDTIME 05/20/18 [History]


Cephalexin [Keflex] 500 mg PO Q6H #30 capsule 08/28/19 [Rx]


Sulfamethoxazole/Trimethoprim [Bactrim Ds Tablet] 1 each PO Q12H #14 tablet 08/ 28/19 [Rx]











Past Medical History


HEENT History: Reports: Impaired Vision


Cardiovascular History: Reports: Hypertension


Respiratory History: Reports: None


Gastrointestinal History: Reports: Chronic Constipation, Other (See Below)


Other Gastrointestinal History: tumor 2009 removal in left groin, right groin 

tumor removal in 2014, right sided lymphedema


Genitourinary History: Reports: Prostate Disorder, Other (See Below)


Other Genitourinary History: cystogram of bladder 2017


Musculoskeletal History: Reports: Fracture


Other Musculoskeletal History: right hip 2015 screws in hip


Neurological History: Reports: Other (See Below)


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


Endocrine/Metabolic History: Reports: Hypothyroidism


Hematologic History: Reports: Anemia, Blood Transfusion(s)


Immunologic History: Reports: HIV


Oncologic (Cancer) History: Reports: Lung, Squamous Cell Carcinoma, Other (See 

Below)


Other Oncologic History: anal cancer


Dermatologic History: Reports: Cellulitis





- Infectious Disease History


Infectious Disease History: Reports: HIV-Human Immunodeficiency Virus, Measles





- Past Surgical History


Head Surgeries/Procedures: Reports: None


HEENT Surgical History: Reports: None


Cardiovascular Surgical History: Reports: None


Respiratory Surgical History: Reports: Lung Resection


Other Respiratory Surgeries/Procedures: right lung resection 2008


GI Surgical History: Reports: Colonoscopy, Other (See Below)


Other GI Surgeries/Procedures: anal cancer in 2009 treated with radiation and 

chemo


Endocrine Surgical History: Reports: None


Neurological Surgical History: Reports: None


Musculoskeletal Surgical History: Reports: None


Oncologic Surgical History: Reports: None


Dermatological Surgical History: Reports: None





Social & Family History





- Tobacco Use


Smoking Status *Q: Former Smoker


Used Tobacco, but Quit: Yes


Month/Year Tobacco Last Used: 10/2008





- Caffeine Use


Caffeine Use: Reports: Coffee





- Recreational Drug Use


Recreational Drug Use: No





Review of Systems





- Review of Systems


Review Of Systems: See Below


Constitutional: Reports: No Symptoms


Respiratory: Reports: No Symptoms


Cardiovascular: Reports: Edema (slight swelling of R calf area)


GI/Abdominal: Reports: No Symptoms


Musculoskeletal: Reports: No Symptoms


Skin: Reports: Erythema (slight of R leg below the knees, warmer to palpation 

than the opposite leg.)


Neurological: Reports: No Symptoms





ED EXAM, GENERAL





- Physical Exam


Exam: See Below


Exam Limited By: No Limitations


General Appearance: Alert, WD/WN, No Apparent Distress


Respiratory/Chest: No Respiratory Distress, No Accessory Muscle Use


Cardiovascular: Regular Rate, Rhythm.  No: No Edema


Extremities: Normal Range of Motion, Non-Tender, Pedal Edema (R calf slightly 

swollen when compared to the left side.).  No: No Pedal Edema


Neurological: Alert, Oriented, CN II-XII Intact, Normal Cognition, No Motor/

Sensory Deficits


Psychiatric: Normal Affect, Normal Mood


Skin Exam: Warm, Dry, Intact, No Rash, Erythema, Increased Warmth (of R calf 

area only).  No: Normal Color, Lymphangitis





Course





- Vital Signs


Last Recorded V/S: 


 Last Vital Signs











Temp  35.5 C   08/28/19 13:57


 


Pulse  78   08/28/19 13:57


 


Resp  16   08/28/19 13:57


 


BP  121/81   08/28/19 13:57


 


Pulse Ox  94 L  08/28/19 13:57














- Orders/Labs/Meds


Labs: 


 Laboratory Tests











  08/28/19 08/28/19 Range/Units





  14:17 14:17 


 


WBC  8.7   (4.5-11.0)  K/uL


 


RBC  3.57 L   (4.30-5.90)  M/uL


 


Hgb  14.1   (12.0-15.0)  g/dL


 


Hct  41.8   (40.0-54.0)  %


 


MCV  117 H   (80-98)  fL


 


MCH  40 H   (27-31)  pg


 


MCHC  34   (32-36)  %


 


Plt Count  113 L   (150-400)  K/uL


 


D-Dimer, Quantitative   < 100  (0.0-400.0)  ng/mL











Meds: 


Medications














Discontinued Medications














Generic Name Dose Route Start Last Admin





  Trade Name Freq  PRN Reason Stop Dose Admin


 


Cephalexin  500 mg  08/28/19 15:05  





  Keflex  PO  08/28/19 15:06  





  ONETIME ONE   





     





     





     





     


 


Trimethoprim/Sulfamethoxazole  1 tab  08/28/19 15:06  





  Septra Ds  PO  08/28/19 15:07  





  ONETIME ONE   





     





     





     





     














Departure





- Departure


Time of Disposition: 15:10


Disposition: Home, Self-Care 01


Condition: Fair


Clinical Impression: 


 Cellulitis of right leg without foot








- Discharge Information


*PRESCRIPTION DRUG MONITORING PROGRAM REVIEWED*: No


*COPY OF PRESCRIPTION DRUG MONITORING REPORT IN PATIENT MOLLY: No


Prescriptions: 


Cephalexin [Keflex] 500 mg PO Q6H #30 capsule


Sulfamethoxazole/Trimethoprim [Bactrim Ds Tablet] 1 each PO Q12H #14 tablet


Instructions:  Cellulitis, Adult


Referrals: 


Jacinto Fung MD [Primary Care Provider] - 


Forms:  ED Department Discharge


Additional Instructions: 


Use cephalexin and TMP/SMZ as directed. Keep leg elevated as much as possible. 

Recheck with your provider early next week, sooner if worse.

## 2019-10-30 NOTE — EDM.PDOC
ED HPI GENERAL MEDICAL PROBLEM





- General


Chief Complaint: Skin Complaint


Stated Complaint: CELLULITIS


Time Seen by Provider: 10/30/19 13:45


Source of Information: Reports: Patient


History Limitations: Reports: No Limitations





- History of Present Illness


INITIAL COMMENTS - FREE TEXT/NARRATIVE: 





54-year-old patient, HIV positive, who has had a problem with redeveloping 

cellulitis over the past several years. It tends to occur in his right leg. He 

now has some deep cracks in the skin of the right heel that he is trying to 

keep clean, however the lower leg started to become warm 2 days ago and he ran 

a low-grade fever. He developed significant pain in his leg, it is actually 

improving slightly so he came in to get antibiotics. Cephalexin "always works".


Onset: Gradual


Duration: Day(s): (3-4 days)


Location: Reports: Lower Extremity, Right


Associated Symptoms: Reports: Fever/Chills, Malaise, Weakness.  Denies: Nausea/

Vomiting, Shortness of Breath


  ** Left Leg


Pain Score (Numeric/FACES): 9





- Related Data


 Allergies











Allergy/AdvReac Type Severity Reaction Status Date / Time


 


abacavir Allergy Severe Fever Verified 08/28/19 13:52











Home Meds: 


 Home Meds





Alpha-D-Galactosidase [Beano] 2 each PO ASDIRECTED PRN 05/30/14 [History]


Ascorbic Acid [Vitamin C] 500 mg PO DAILY 05/30/14 [History]


Aspirin [Adult Low Dose Aspirin EC] 81 mg PO DAILY 05/30/14 [History]


Ca Cmb No.1/Vit D3/B-6/FA/B12 [Vitamin D3 1,000 Unit] 1 each PO DAILY 05/30/14 [

History]


Calcium Carbonate/Vitamin D3 [Calcium 600 + Vit D Tablet] 1 each PO DAILY 05/30/ 14 [History]


Cyanocobalamin (Vitamin B-12) [Vitamin B-12] 500 mcg PO DAILY 05/30/14 [History]


Diphenoxylate HCl/Atropine [Lomotil] 1 - 2 tab PO TID PRN 05/30/14 [History]


Efavirenz [Sustiva] 600 mg PO BEDTIME 05/30/14 [History]


Fish Oil/Omega-3 Fatty Acids [Fish Oil 1,000 MG] 1 each PO DAILY 05/30/14 [

History]


Levothyroxine Sodium [Synthroid] 175 mcg PO DAILY 05/30/14 [History]


Psyllium with Sucrose [Metamucil] 1 each PO ASDIRECTED PRN 05/30/14 [History]


Raltegravir [Isentress] 400 mg PO BID 05/30/14 [History]


Solifenacin [Vesicare] 10 mg PO DAILY 05/30/14 [History]


Tamsulosin [Flomax] 0.4 mg PO DAILY 05/30/14 [History]


lamiVUDine/Zidovudine [Combivir 150-300 MG] 1 tab PO BID 05/30/14 [History]


Alendronate Sodium [Fosamax] 70 mg PO ASDIRECTED 11/15/17 [History]


Amoxicillin 1 tab PO ASDIRECTED PRN 11/15/17 [History]


Baclofen 10 mg PO BID 11/15/17 [History]


Finasteride 1 tab PO DAILY 11/15/17 [History]


oxyCODONE 1 tab PO Q6H PRN 11/15/17 [History]


Dapsone 1 tab PO DAILY 02/15/18 [History]


amLODIPine [Norvasc] 2.5 mg PO BEDTIME 05/20/18 [History]


Cephalexin [Keflex] 500 mg PO Q6H #30 capsule 08/28/19 [Rx]


Sulfamethoxazole/Trimethoprim [Bactrim Ds Tablet] 1 each PO Q12H #14 tablet 08/ 28/19 [Rx]











Past Medical History


HEENT History: Reports: Impaired Vision


Cardiovascular History: Reports: Hypertension


Respiratory History: Reports: None


Gastrointestinal History: Reports: Chronic Constipation, Other (See Below)


Other Gastrointestinal History: tumor 2009 removal in left groin, right groin 

tumor removal in 2014, right sided lymphedema


Genitourinary History: Reports: Prostate Disorder, Other (See Below)


Other Genitourinary History: cystogram of bladder 2017


Musculoskeletal History: Reports: Fracture


Other Musculoskeletal History: right hip 2015 screws in hip


Neurological History: Reports: Other (See Below)


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


Endocrine/Metabolic History: Reports: Hypothyroidism


Hematologic History: Reports: Anemia, Blood Transfusion(s)


Immunologic History: Reports: HIV


Oncologic (Cancer) History: Reports: Lung, Squamous Cell Carcinoma, Other (See 

Below)


Other Oncologic History: anal cancer


Dermatologic History: Reports: Cellulitis





- Infectious Disease History


Infectious Disease History: Reports: HIV-Human Immunodeficiency Virus, Measles





- Past Surgical History


Head Surgeries/Procedures: Reports: None


HEENT Surgical History: Reports: None


Cardiovascular Surgical History: Reports: None


Respiratory Surgical History: Reports: Lung Resection


Other Respiratory Surgeries/Procedures: right lung resection 2008


GI Surgical History: Reports: Colonoscopy, Other (See Below)


Other GI Surgeries/Procedures: anal cancer in 2009 treated with radiation and 

chemo


Endocrine Surgical History: Reports: None


Neurological Surgical History: Reports: None


Musculoskeletal Surgical History: Reports: None


Oncologic Surgical History: Reports: None


Dermatological Surgical History: Reports: None





Social & Family History





- Tobacco Use


Smoking Status *Q: Former Smoker


Used Tobacco, but Quit: Yes


Month/Year Tobacco Last Used: 24





- Caffeine Use


Caffeine Use: Reports: Coffee





ED ROS GENERAL





- Review of Systems


Review Of Systems: See Below


Constitutional: Reports: Fever, Chills, Malaise


HEENT: Reports: No Symptoms


Respiratory: Denies: Shortness of Breath


Cardiovascular: Denies: Chest Pain


GI/Abdominal: Reports: Decreased Appetite.  Denies: Abdominal Pain, Nausea, 

Vomiting


: Reports: Other (Chronic indwelling Guzman catheter)


Skin: Reports: Erythema (Erythema of the right lower leg)


Neurological: Denies: Headache





ED EXAM, SKIN/RASH


Exam: See Below


Exam Limited By: No Limitations


General Appearance: Alert, No Apparent Distress


Respiratory/Chest: No Respiratory Distress


Cardiovascular: Regular Rate, Rhythm.  No: Tachycardia


 (Male) Exam: Other (Indwelling catheter)


Extremities: Other (Right lower extremity does have some erythema of the skin 

around the and anterior shin which is somewhat warmer than the more proximal 

skin. There are no streaks up the thigh, no pain in the popliteal space.)





Course





- Vital Signs


Last Recorded V/S: 


 Last Vital Signs











Temp  97.3 F   10/30/19 13:33


 


Pulse  76   10/30/19 13:33


 


Resp  18   10/30/19 13:33


 


BP  140/100 H  10/30/19 13:33


 


Pulse Ox  94 L  10/30/19 13:33














- Re-Assessments/Exams


Free Text/Narrative Re-Assessment/Exam: 





10/30/19 14:08


Patient will be placed on cephalexin 500 mg 3 times a day for 7 days. Warm 

compresses to the area may help, and he can increase activity as tolerated. 

Return for recheck in 2-3 days if not improving satisfactorily.





Departure





- Departure


Time of Disposition: 14:22


Disposition: Home, Self-Care 01


Clinical Impression: 


 Cellulitis of right leg








- Discharge Information


Instructions:  Cellulitis, Adult


Referrals: 


Jacinto Fung MD [Primary Care Provider] - 


Forms:  ED Department Discharge


Care Plan Goals: 


Take antibiotic until gone. Warmth to the leg may be beneficial along with 

elevation. Increase activity as tolerated, and recheck in 2-3 days if not 

improving despite medication.

## 2020-12-16 NOTE — EDM.PDOC
ED HPI GENERAL MEDICAL PROBLEM





- General


Chief Complaint: Skin Complaint


Stated Complaint: SWOLLEN RIGHT LEG


Time Seen by Provider: 12/16/20 22:34


Source of Information: Reports: Patient, Old Records, RN Notes Reviewed


History Limitations: Reports: No Limitations





- History of Present Illness


INITIAL COMMENTS - FREE TEXT/NARRATIVE: 





55-year-old gentleman presents emergency department a complaint of redness and 

warmth to his right lower leg, he was evaluated by podiatry currently being 

evaluated for peripheral vascular disease he does have an area of broken skin on

the shin this area was outlined by podiatry, the redness has increased in size 

and is now going outside the marks


  ** right lower leg


Pain Score (Numeric/FACES): 7





- Related Data


                                    Allergies











Allergy/AdvReac Type Severity Reaction Status Date / Time


 


abacavir Allergy Severe Fever Verified 12/16/20 22:20











Home Meds: 


                                    Home Meds





Alpha-D-Galactosidase [Beano] 2 each PO ASDIRECTED PRN 05/30/14 [History]


Ascorbic Acid [Vitamin C] 500 mg PO BEDTIME 05/30/14 [History]


Aspirin [Adult Low Dose Aspirin EC] 81 mg PO DAILY 05/30/14 [History]


Calcium Carbonate/Vitamin D3 [Calcium 600 + Vit D Tablet] 1 each PO BEDTIME 

05/30/14 [History]


Cyanocobalamin (Vitamin B-12) [Vitamin B-12] 500 mcg PO BEDTIME 05/30/14 

[History]


Diphenoxylate HCl/Atropine [Lomotil] 1 - 2 tab PO TID PRN 05/30/14 [History]


Efavirenz [Sustiva] 600 mg PO BEDTIME 05/30/14 [History]


Fish Oil/Omega-3 Fatty Acids [Fish Oil 1,000 MG] 1 each PO BEDTIME 05/30/14 

[History]


Levothyroxine Sodium [Synthroid] 175 mcg PO BEDTIME 05/30/14 [History]


Psyllium with Sucrose [Metamucil] 1 each PO ASDIRECTED PRN 05/30/14 [History]


Raltegravir [Isentress] 400 mg PO BID 05/30/14 [History]


Tamsulosin [Flomax] 0.4 mg PO BEDTIME 05/30/14 [History]


lamiVUDine/Zidovudine [Combivir 150-300 MG] 1 tab PO BID 05/30/14 [History]


Alendronate Sodium [Fosamax] 70 mg PO WEEKLY 11/15/17 [History]


Amoxicillin 1 tab PO ASDIRECTED PRN 11/15/17 [History]


Baclofen 10 mg PO BID 11/15/17 [History]


Finasteride 5 mg PO BEDTIME 11/15/17 [History]


oxyCODONE 1 tab PO Q6H PRN 11/15/17 [History]


amLODIPine [Norvasc] 2.5 mg PO BEDTIME 05/20/18 [History]











Past Medical History


HEENT History: Reports: Impaired Vision


Cardiovascular History: Reports: Hypertension, PVD


Gastrointestinal History: Reports: Chronic Constipation, Other (See Below)


Other Gastrointestinal History: tumor 2009 removal in left groin, right groin 

tumor removal in 2014, right sided lymphedema


Genitourinary History: Reports: Prostate Disorder, Other (See Below)


Other Genitourinary History: cystogram of bladder 2017


Musculoskeletal History: Reports: Fracture


Other Musculoskeletal History: right hip 2015 screws in hip


Neurological History: Reports: Other (See Below)


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


Endocrine/Metabolic History: Reports: Hypothyroidism


Hematologic History: Reports: Anemia, Blood Transfusion(s)


Immunologic History: Reports: HIV


Oncologic (Cancer) History: Reports: Lung, Squamous Cell Carcinoma, Other (See 

Below)


Other Oncologic History: anal cancer


Dermatologic History: Reports: Cellulitis





- Infectious Disease History


Infectious Disease History: Reports: HIV-Human Immunodeficiency Virus, Measles





- Past Surgical History


Head Surgeries/Procedures: Reports: None


HEENT Surgical History: Reports: None


Cardiovascular Surgical History: Reports: None


Respiratory Surgical History: Reports: Lung Resection


Other Respiratory Surgeries/Procedures: right lung resection 2008


GI Surgical History: Reports: Colonoscopy, Other (See Below)


Other GI Surgeries/Procedures: anal cancer in 2009 treated with radiation and 

chemo


Endocrine Surgical History: Reports: None


Neurological Surgical History: Reports: None


Musculoskeletal Surgical History: Reports: None


Oncologic Surgical History: Reports: None


Dermatological Surgical History: Reports: None





Social & Family History





- Tobacco Use


Tobacco Use Status *Q: Never Tobacco User





- Caffeine Use


Caffeine Use: Reports: Coffee





- Recreational Drug Use


Recreational Drug Use: No





ED ROS GENERAL





- Review of Systems


Review Of Systems: See Below


Constitutional: Reports: No Symptoms


Respiratory: Reports: No Symptoms


Cardiovascular: Reports: No Symptoms


GI/Abdominal: Reports: No Symptoms


Skin: Reports: Rash, Erythema, Wound, Change in Color





ED EXAM, SKIN/RASH


Exam: See Below


Text/Narrative:: 





Examination right lower extremity he does have an area about the size of a $0.50

 piece that is a braised consistent with open wound there is erythema outside 

that the area is drawn with a marker this is about the size of a softball it is 

warm to the touch tender to the touch


Exam Limited By: No Limitations


General Appearance: Alert, WD/WN, No Apparent Distress


Respiratory/Chest: No Respiratory Distress





Course





- Vital Signs


Last Recorded V/S: 





                                Last Vital Signs











Temp  98.1 F   12/16/20 22:37


 


Pulse  74   12/16/20 22:37


 


Resp  17   12/16/20 22:37


 


BP  157/93 H  12/16/20 22:37


 


Pulse Ox  97   12/16/20 22:37














Departure





- Departure


Time of Disposition: 22:43


Disposition: Home, Self-Care 01


Condition: Fair


Clinical Impression: 


 Cellulitis of right leg








- Discharge Information


Instructions:  Cellulitis, Adult


Referrals: 


Claudy Jenkins MD [Primary Care Provider] - 


Additional Instructions: 


Take full course of antibiotics,  please followup with your primary care 

provider in 3-5 days if not better, please call return to the emergency 

department with worsening of symptoms.





Sepsis Event Note (ED)





- Evaluation


Sepsis Screening Result: No Definite Risk





- Focused Exam


Vital Signs: 





                                   Vital Signs











  Temp Pulse Resp BP Pulse Ox


 


 12/16/20 22:37  98.1 F  74  17  157/93 H  97


 


 12/16/20 22:36  98.1 F  74  17  157/93 H  97














- Assessment/Plan


Plan: 





Assessment





Acuity = acute





Site and laterality = cellulitis right lower extremity





Etiology  = probable bacterial cause





Manifestations = none





Location of injury =  Home





Lab values = none





Plan


Elected to treat empirically Keflex 500 mg p.o. 4 times daily he does have 

follow-up appointment with podiatry x7 days

















 This note was dictated using dragon voice recognition software please call with

any questions on syntax or grammar.

## 2021-06-10 NOTE — EDM.PDOC
ED HPI GENERAL MEDICAL PROBLEM





- General


Chief Complaint: Genitourinary Problem


Stated Complaint: BLOCKED CATHETER


Time Seen by Provider: 06/10/21 22:07


Source of Information: Reports: Patient


History Limitations: Reports: No Limitations





- History of Present Illness


INITIAL COMMENTS - FREE TEXT/NARRATIVE: 


Arie is a 56-year-old male presenting to the ED with a Guzman catheter p

ankur.  Patient states he is not had any drainage of urine from the Guzman since

1800 hrs.  The patient is well-known to us in the emergency room and is 

presented on occasion with plugged Guzman catheter requiring its replacement.  He

denies any fever or chills.  He does state that his bladder feels quite full.








- Related Data


                                    Allergies











Allergy/AdvReac Type Severity Reaction Status Date / Time


 


abacavir Allergy Severe Fever Verified 12/16/20 22:20











Home Meds: 


                                    Home Meds





Alpha-D-Galactosidase [Beano] 2 each PO ASDIRECTED PRN 05/30/14 [History]


Ascorbic Acid [Vitamin C] 500 mg PO BEDTIME 05/30/14 [History]


Aspirin [Adult Low Dose Aspirin EC] 81 mg PO DAILY 05/30/14 [History]


Calcium Carbonate/Vitamin D3 [Calcium 600 + Vit D Tablet] 1 each PO BEDTIME 

05/30/14 [History]


Cyanocobalamin (Vitamin B-12) [Vitamin B-12] 500 mcg PO BEDTIME 05/30/14 

[History]


Diphenoxylate HCl/Atropine [Lomotil] 1 - 2 tab PO TID PRN 05/30/14 [History]


Efavirenz [Sustiva] 600 mg PO BEDTIME 05/30/14 [History]


Fish Oil/Omega-3 Fatty Acids [Fish Oil 1,000 MG] 1 each PO BEDTIME 05/30/14 

[History]


Levothyroxine Sodium [Synthroid] 175 mcg PO BEDTIME 05/30/14 [History]


Psyllium with Sucrose [Metamucil] 1 each PO ASDIRECTED PRN 05/30/14 [History]


Raltegravir [Isentress] 400 mg PO BID 05/30/14 [History]


lamiVUDine/Zidovudine [Combivir 150-300 MG] 1 tab PO BID 05/30/14 [History]


Alendronate Sodium [Fosamax] 70 mg PO WEEKLY 11/15/17 [History]


Amoxicillin 1 tab PO ASDIRECTED PRN 11/15/17 [History]


Baclofen 10 mg PO BID 11/15/17 [History]


Finasteride 5 mg PO BEDTIME 11/15/17 [History]


amLODIPine [Norvasc] 2.5 mg PO BEDTIME 05/20/18 [History]


Clopidogrel Bisulfate [Clopidogrel] 75 mg PO DAILY 06/10/21 [History]


amLODIPine [Norvasc] 2.5 mg PO DAILY 06/10/21 [History]











Past Medical History


HEENT History: Reports: Impaired Vision


Cardiovascular History: Reports: Hypertension, PVD, Other (See Below)


Other Cardiovascular History: ?leaky valve?


Respiratory History: Reports: None


Gastrointestinal History: Reports: Chronic Constipation, Other (See Below)


Other Gastrointestinal History: tumor 2009 removal in left groin, right groin 

tumor removal in 2014, right sided lymphedema


Genitourinary History: Reports: Chronic Renal Insuffiency, Prostate Disorder, 

Other (See Below)


Other Genitourinary History: cystogram of bladder 2017


Musculoskeletal History: Reports: Fracture


Other Musculoskeletal History: right hip 2015 screws in hip


Neurological History: Reports: Other (See Below)


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


Endocrine/Metabolic History: Reports: Hypothyroidism


Hematologic History: Reports: Anemia, Blood Transfusion(s)


Immunologic History: Reports: HIV


Oncologic (Cancer) History: Reports: Lung, Squamous Cell Carcinoma, Other (See 

Below)


Other Oncologic History: anal cancer


Dermatologic History: Reports: Cellulitis





- Infectious Disease History


Infectious Disease History: Reports: Chicken Pox, HIV-Human Immunodeficiency 

Virus, Measles





- Past Surgical History


Head Surgeries/Procedures: Reports: None


HEENT Surgical History: Reports: None


Cardiovascular Surgical History: Reports: None, Carotid Endarterectomy


Respiratory Surgical History: Reports: Lung Resection


Other Respiratory Surgeries/Procedures: right lung resection 2008


GI Surgical History: Reports: Colonoscopy, Other (See Below)


Other GI Surgeries/Procedures: anal cancer in 2009 treated with radiation and 

chemo


Endocrine Surgical History: Reports: None


Neurological Surgical History: Reports: None


Musculoskeletal Surgical History: Reports: None


Oncologic Surgical History: Reports: None


Dermatological Surgical History: Reports: None





Social & Family History





- Tobacco Use


Tobacco Use Status *Q: Former Tobacco User


Used Tobacco, but Quit: Yes


Month/Year Tobacco Last Used: 2008





- Caffeine Use


Caffeine Use: Reports: Coffee





ED ROS GENERAL





- Review of Systems


Review Of Systems: See Below


Constitutional: Reports: No Symptoms


HEENT: Reports: No Symptoms


Respiratory: Reports: No Symptoms


Cardiovascular: Reports: No Symptoms


Endocrine: Reports: No Symptoms


GI/Abdominal: Reports: No Symptoms


: Reports: Urinary Retention (Indwelling Guzman catheter is not draining and 

bladder feels quite full.)


Musculoskeletal: Reports: No Symptoms


Skin: Reports: No Symptoms


Neurological: Reports: No Symptoms


Psychiatric: Reports: No Symptoms


Hematologic/Lymphatic: Reports: No Symptoms





ED EXAM, RENAL/





- Physical Exam


Exam: See Below


Exam Limited By: No Limitations


General Appearance: Alert, No Apparent Distress


Respiratory/Chest: No Respiratory Distress, Lungs Clear, Normal Breath Sounds


Cardiovascular: Normal Peripheral Pulses, Regular Rate, Rhythm, No Murmur


GI/Abdominal: Normal Bowel Sounds, Soft, Non-Tender


 (Male) Exam: Other (Indwelling Guzman catheter has been replaced and is now 

functioning properly.)


Neurological: Alert, Oriented, Normal Cognition, No Motor/Sensory Deficits





Course





- Vital Signs


Last Recorded V/S: 


                                Last Vital Signs











Temp  35.5 C L  06/10/21 21:33


 


Pulse  100   06/10/21 21:33


 


Resp  16   06/10/21 21:33


 


BP  138/90   06/10/21 21:33


 


Pulse Ox  97   06/10/21 21:33














- Orders/Labs/Meds


Labs: 


                                Laboratory Tests











  06/10/21 Range/Units





  22:09 


 


Urine Color  Red A  (YELLOW)  


 


Urine Appearance  Turbid A  (CLEAR)  


 


Urine pH  7.0  (5.0-8.0)  


 


Ur Specific Gravity  1.020  (1.008-1.030)  


 


Urine Protein  >=300 H  (NEGATIVE)  mg/dL


 


Urine Glucose (UA)  Negative  (NEGATIVE)  mg/dL


 


Urine Ketones  Negative  (NEGATIVE)  mg/dL


 


Urine Occult Blood  Large H  (NEGATIVE)  


 


Urine Nitrite  Positive H  (NEGATIVE)  


 


Urine Bilirubin  Negative  (NEGATIVE)  


 


Urine Urobilinogen  0.2  (0.2-1.0)  EU/dL


 


Ur Leukocyte Esterase  Small H  (NEGATIVE)  


 


Urine RBC  Packed H  (0-5)  


 


Urine WBC  10-20 H  (0-5)  


 


Ur Epithelial Cells  Not seen  


 


Amorphous Sediment  Occasional  


 


Urine Bacteria  Few  


 


Urine Mucus  Not seen  














- Re-Assessments/Exams


Free Text/Narrative Re-Assessment/Exam: 





06/10/21 22:50 urinalysis shows packed RBCs with 10-20 WBCs and positive nitri

te.  Leukocyte Estrace is negative.  As this is an indwelling Guzman this is 

likely normal colonization and does not require antibiotics.  Guzman is now 

working properly.  At this time patient is suitable for discharge home in 

satisfactory condition.  He is encouraged to continue to push fluids to help 

reduce the amount of sediment.  Follow-up with your primary care provider as 

needed.











Departure





- Departure


Time of Disposition: 22:51


Disposition: Home, Self-Care 01


Clinical Impression: 


 Encounter for Guzman catheter replacement








- Discharge Information


Instructions:  Indwelling Urinary Catheter Care, Adult


Referrals: 


Claudy Jenkins MD [Primary Care Provider] - 


Forms:  ED Department Discharge


Care Plan Goals: 


Follow-up with your primary care provider as needed.





Sepsis Event Note (ED)





- Evaluation


Sepsis Screening Result: No Definite Risk





- Focused Exam


Vital Signs: 


                                   Vital Signs











  Temp Pulse Resp BP Pulse Ox


 


 06/10/21 21:33  35.5 C L  100  16  138/90  97


 


 06/10/21 21:25  35.5 C L  100  16  138/90  97














- Problem List & Annotations


(1) Encounter for Guzman catheter replacement


SNOMED Code(s): 773749767, 033886489


   Code(s): Z46.6 - ENCOUNTER FOR FITTING AND ADJUSTMENT OF URINARY DEVICE   

Status: Acute   Priority: Low   Current Visit: Yes   





- Problem List Review


Problem List Initiated/Reviewed/Updated: Yes

## 2021-06-18 NOTE — EDM.PDOC
ED HPI GENERAL MEDICAL PROBLEM





- General


Chief Complaint: Genitourinary Problem


Stated Complaint: BLOCKED CATHETER, FULL BLADDER


Time Seen by Provider: 06/18/21 09:00


Source of Information: Reports: Patient, Old Records


History Limitations: Reports: No Limitations





- History of Present Illness


INITIAL COMMENTS - FREE TEXT/NARRATIVE: 





55 yo male presents with a chronic indwelling guerrero catheter plugged. He 

produces a lot of sediment and has a problem with recurring catheter plugging. 

He denies fever or chills today. 


Onset: Today


Onset Date: 06/18/21


Duration: Hour(s):, Getting Worse


Location: Reports: Pelvis


Quality: Reports: Pressure (bladder)


Severity: Mild


Improves with: Reports: None


Worsens with: Reports: Other (time)


Context: Reports: Other (See HPI)


Associated Symptoms: Reports: No Other Symptoms.  Denies: Fever/Chills, 

Nausea/Vomiting


Treatments PTA: Reports: Other (see below) (none)





- Related Data


                                    Allergies











Allergy/AdvReac Type Severity Reaction Status Date / Time


 


abacavir Allergy Severe Fever Verified 12/16/20 22:20











Home Meds: 


                                    Home Meds





Alpha-D-Galactosidase [Beano] 2 each PO ASDIRECTED PRN 05/30/14 [History]


Ascorbic Acid [Vitamin C] 500 mg PO BEDTIME 05/30/14 [History]


Aspirin [Adult Low Dose Aspirin EC] 81 mg PO DAILY 05/30/14 [History]


Calcium Carbonate/Vitamin D3 [Calcium 600 + Vit D Tablet] 1 each PO BEDTIME 

05/30/14 [History]


Cyanocobalamin (Vitamin B-12) [Vitamin B-12] 500 mcg PO BEDTIME 05/30/14 

[History]


Diphenoxylate HCl/Atropine [Lomotil] 1 - 2 tab PO TID PRN 05/30/14 [History]


Efavirenz [Sustiva] 600 mg PO BEDTIME 05/30/14 [History]


Fish Oil/Omega-3 Fatty Acids [Fish Oil 1,000 MG] 1 each PO BEDTIME 05/30/14 

[History]


Levothyroxine Sodium [Synthroid] 175 mcg PO BEDTIME 05/30/14 [History]


Psyllium with Sucrose [Metamucil] 1 each PO ASDIRECTED PRN 05/30/14 [History]


Raltegravir [Isentress] 400 mg PO BID 05/30/14 [History]


lamiVUDine/Zidovudine [Combivir 150-300 MG] 1 tab PO BID 05/30/14 [History]


Alendronate Sodium [Fosamax] 70 mg PO WEEKLY 11/15/17 [History]


Amoxicillin 1 tab PO ASDIRECTED PRN 11/15/17 [History]


Baclofen 10 mg PO BID 11/15/17 [History]


Finasteride 5 mg PO BEDTIME 11/15/17 [History]


amLODIPine [Norvasc] 2.5 mg PO BEDTIME 05/20/18 [History]


Clopidogrel Bisulfate [Clopidogrel] 75 mg PO DAILY 06/10/21 [History]


amLODIPine [Norvasc] 2.5 mg PO DAILY 06/10/21 [History]


Potassium Citrate 10 meq PO Q8H #90 tab.er 06/18/21 [Rx]











Past Medical History


HEENT History: Reports: Impaired Vision


Cardiovascular History: Reports: Hypertension, PVD, Other (See Below)


Other Cardiovascular History: ?leaky valve?


Respiratory History: Reports: None


Gastrointestinal History: Reports: Chronic Constipation, Other (See Below)


Other Gastrointestinal History: tumor 2009 removal in left groin, right groin 

tumor removal in 2014, right sided lymphedema


Genitourinary History: Reports: Chronic Renal Insuffiency, Prostate Disorder, 

Other (See Below)


Other Genitourinary History: cystogram of bladder 2017


Musculoskeletal History: Reports: Fracture


Other Musculoskeletal History: right hip 2015 screws in hip


Neurological History: Reports: Other (See Below)


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


Endocrine/Metabolic History: Reports: Hypothyroidism


Hematologic History: Reports: Anemia, Blood Transfusion(s)


Immunologic History: Reports: HIV


Oncologic (Cancer) History: Reports: Lung, Squamous Cell Carcinoma, Other (See 

Below)


Other Oncologic History: anal cancer


Dermatologic History: Reports: Cellulitis





- Infectious Disease History


Infectious Disease History: Reports: Chicken Pox, HIV-Human Immunodeficiency 

Virus, Measles





- Past Surgical History


Head Surgeries/Procedures: Reports: None


HEENT Surgical History: Reports: None


Cardiovascular Surgical History: Reports: None, Carotid Endarterectomy


Respiratory Surgical History: Reports: Lung Resection


Other Respiratory Surgeries/Procedures: right lung resection 2008


GI Surgical History: Reports: Colonoscopy, Other (See Below)


Other GI Surgeries/Procedures: anal cancer in 2009 treated with radiation and 

chemo


Endocrine Surgical History: Reports: None


Neurological Surgical History: Reports: None


Musculoskeletal Surgical History: Reports: None


Oncologic Surgical History: Reports: None


Dermatological Surgical History: Reports: None





Social & Family History





- Caffeine Use


Caffeine Use: Reports: Coffee





ED ROS GENERAL





- Review of Systems


Review Of Systems: See Below


Constitutional: Reports: No Symptoms.  Denies: Fever, Chills


GI/Abdominal: Reports: No Symptoms


: Reports: Pain (bladder pressure from plugged guerrero)


Skin: Reports: No Symptoms





ED EXAM, RENAL/





- Physical Exam


Exam: See Below


Exam Limited By: No Limitations


General Appearance: Alert, WD/WN, No Apparent Distress, Thin


 (Male) Exam: Scrotal Swelling (chronic), Other (some bladder distention)


Neurological: Alert, Oriented, CN II-XII Intact, Normal Cognition, No 

Motor/Sensory Deficits


Psychiatric: Normal Affect, Normal Mood


Skin Exam: Warm, Dry, Intact, Normal Color, No Rash





Course





- Vital Signs


Text/Narrative:: 





catheter changes per RN


Last Recorded V/S: 





                                Last Vital Signs











Temp  36.4 C   06/18/21 08:50


 


Pulse  89   06/18/21 08:50


 


Resp  16   06/18/21 08:50


 


BP  155/93 H  06/18/21 08:50


 


Pulse Ox  99   06/18/21 08:50














- Orders/Labs/Meds


Orders: 





                               Active Orders 24 hr











 Category Date Time Status


 


 Guerrero Catheter Insertion [Insert Urinary Catheter] [OM. Care  06/18/21 08:45 

Ordered





 PC] Q24H   


 


 Urinary Catheter Assessment [RC] ASDIRECTED Care  06/18/21 08:32 Active














Departure





- Departure


Time of Disposition: 09:25


Disposition: Home, Self-Care 01


Condition: Good


Clinical Impression: 


Obstructed Guerrero catheter


Qualifiers:


 Encounter type: initial encounter Qualified Code(s): T83.091A - Other 

mechanical complication of indwelling urethral catheter, initial encounter








- Discharge Information


*PRESCRIPTION DRUG MONITORING PROGRAM REVIEWED*: Not Applicable


*COPY OF PRESCRIPTION DRUG MONITORING REPORT IN PATIENT MOLLY: Not Applicable


Referrals: 


Claudy Jenkins MD [Primary Care Provider] - 


Additional Instructions: 


Continue current practices/cares. Start potassium citrate 10 meq every 8 hrs to 

try to reduce catheter plugging, Rx to [facility]. Recheck as needed. 





Sepsis Event Note (ED)





- Focused Exam


Vital Signs: 





                                   Vital Signs











  Temp Pulse Resp BP Pulse Ox


 


 06/18/21 08:50  36.4 C  89  16  155/93 H  99














- My Orders


Last 24 Hours: 





My Active Orders





06/18/21 08:32


Urinary Catheter Assessment [RC] ASDIRECTED 





06/18/21 08:45


Guerrero Catheter Insertion [Insert Urinary Catheter] [OM.PC] Q24H 














- Assessment/Plan


Last 24 Hours: 





My Active Orders





06/18/21 08:32


Urinary Catheter Assessment [RC] ASDIRECTED 





06/18/21 08:45


Guerrero Catheter Insertion [Insert Urinary Catheter] [OM.PC] Q24H

## 2021-07-11 NOTE — EDM.PDOC
ED HPI GENERAL MEDICAL PROBLEM





- General


Chief Complaint: Genitourinary Problem


Stated Complaint: BLOCKED CATHETER


Time Seen by Provider: 07/11/21 04:22


Source of Information: Reports: Patient, Old Records


History Limitations: Reports: No Limitations





- History of Present Illness


INITIAL COMMENTS - FREE TEXT/NARRATIVE: 


Arie is a 56-year-old male presenting to the ED for replacement of his 

indwelling catheter after the current 1 has become plugged and has not been able

to unplug it.  Patient is well-known to me from previous encounter last month 

when he presented with similar symptoms.  He has an indwelling catheter for 

urinary retention and excessive urinary sediment.  Catheter was last changed on 

6/18/2021 and he was scheduled to have a catheter change on 7/9/2021, however, 

they could not get him in the clinic to change it until 7/13/2021.





  ** Bladder


Pain Score (Numeric/FACES): 10





- Related Data


                                    Allergies











Allergy/AdvReac Type Severity Reaction Status Date / Time


 


abacavir Allergy Severe Fever Verified 07/11/21 04:30











Home Meds: 


                                    Home Meds





Alpha-D-Galactosidase [Beano] 2 each PO ASDIRECTED PRN 05/30/14 [History]


Ascorbic Acid [Vitamin C] 500 mg PO BEDTIME 05/30/14 [History]


Aspirin [Adult Low Dose Aspirin EC] 81 mg PO DAILY 05/30/14 [History]


Calcium Carbonate/Vitamin D3 [Calcium 600 + Vit D Tablet] 1 each PO BEDTIME 

05/30/14 [History]


Cyanocobalamin (Vitamin B-12) [Vitamin B-12] 500 mcg PO BEDTIME 05/30/14 

[History]


Diphenoxylate HCl/Atropine [Lomotil] 1 - 2 tab PO TID PRN 05/30/14 [History]


Efavirenz [Sustiva] 600 mg PO BEDTIME 05/30/14 [History]


Fish Oil/Omega-3 Fatty Acids [Fish Oil 1,000 MG] 1 each PO BEDTIME 05/30/14 

[History]


Levothyroxine Sodium [Synthroid] 175 mcg PO BEDTIME 05/30/14 [History]


Psyllium with Sucrose [Metamucil] 1 each PO ASDIRECTED PRN 05/30/14 [History]


Raltegravir [Isentress] 400 mg PO BID 05/30/14 [History]


lamiVUDine/Zidovudine [Combivir 150-300 MG] 1 tab PO BID 05/30/14 [History]


Alendronate Sodium [Fosamax] 70 mg PO WEEKLY 11/15/17 [History]


Amoxicillin 1 tab PO ASDIRECTED PRN 11/15/17 [History]


Baclofen 10 mg PO BID 11/15/17 [History]


Finasteride 5 mg PO BEDTIME 11/15/17 [History]


amLODIPine [Norvasc] 2.5 mg PO BEDTIME 05/20/18 [History]


Clopidogrel Bisulfate [Clopidogrel] 75 mg PO DAILY 06/10/21 [History]


Potassium Citrate 10 meq PO Q8H #90 tab.er 06/18/21 [Rx]











Past Medical History


HEENT History: Reports: Impaired Vision


Cardiovascular History: Reports: Hypertension, PVD, Other (See Below)


Other Cardiovascular History: ?leaky valve?


Respiratory History: Reports: None


Gastrointestinal History: Reports: Chronic Constipation, Other (See Below)


Other Gastrointestinal History: tumor 2009 removal in left groin, right groin 

tumor removal in 2014, right sided lymphedema


Genitourinary History: Reports: Chronic Renal Insuffiency, Prostate Disorder, 

Other (See Below)


Other Genitourinary History: cystogram of bladder 2017


Musculoskeletal History: Reports: Fracture


Other Musculoskeletal History: right hip 2015 screws in hip


Neurological History: Reports: Other (See Below)


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


Endocrine/Metabolic History: Reports: Hypothyroidism


Hematologic History: Reports: Anemia, Blood Transfusion(s)


Immunologic History: Reports: HIV


Oncologic (Cancer) History: Reports: Lung, Squamous Cell Carcinoma, Other (See 

Below)


Other Oncologic History: anal cancer


Dermatologic History: Reports: Cellulitis





- Infectious Disease History


Infectious Disease History: Reports: Chicken Pox, HIV-Human Immunodeficiency 

Virus, Measles





- Past Surgical History


Head Surgeries/Procedures: Reports: None


HEENT Surgical History: Reports: None


Cardiovascular Surgical History: Reports: None, Carotid Endarterectomy


Respiratory Surgical History: Reports: Lung Resection


Other Respiratory Surgeries/Procedures: right lung resection 2008


GI Surgical History: Reports: Colonoscopy, Other (See Below)


Other GI Surgeries/Procedures: anal cancer in 2009 treated with radiation and 

chemo


Endocrine Surgical History: Reports: None


Neurological Surgical History: Reports: None


Musculoskeletal Surgical History: Reports: None


Oncologic Surgical History: Reports: None


Dermatological Surgical History: Reports: None





Social & Family History





- Caffeine Use


Caffeine Use: Reports: Coffee





ED ROS GENERAL





- Review of Systems


Review Of Systems: See Below


: Reports: Urinary Retention, Other (Guzman catheter is plugged and scheduled 

to be replaced on Monday.  Patient has significant urinary sediment.)





ED EXAM, RENAL/





- Physical Exam


Exam: See Below


Exam Limited By: No Limitations


GI/Abdominal: Normal Bowel Sounds, Soft, Non-Tender


 (Male) Exam: No: Penile Lesions, Scrotal Swelling





Course





- Vital Signs


Last Recorded V/S: 


                                Last Vital Signs











Temp  35.8 C L  07/11/21 04:29


 


Pulse  87   07/11/21 04:29


 


Resp  18   07/11/21 04:29


 


BP  201/112 H  07/11/21 04:29


 


Pulse Ox  98   07/11/21 04:29














- Orders/Labs/Meds


Orders: 


                               Active Orders 24 hr











 Category Date Time Status


 


 Guzman Catheter Insertion [Insert Urinary Catheter] [OM. Care  07/11/21 04:30 

Ordered





 PC] Q24H   


 


 Urinary Catheter Assessment [RC] ASDIRECTED Care  07/11/21 04:23 Active











Meds: 


Medications














Discontinued Medications














Generic Name Dose Route Start Last Admin





  Trade Name Freq  PRN Reason Stop Dose Admin


 


Lidocaine HCl  10 ml  07/11/21 04:23  07/11/21 04:45





  Lidocaine 2% Jelly 10 Ml Urojet  MUCMEM  07/11/21 04:24  10 ml





  ONETIME ONE   Administration














- Re-Assessments/Exams


Free Text/Narrative Re-Assessment/Exam: 





07/11/21 05:20  A new 16 French catheter was placed and is now functioning nor

cristino.





Departure





- Departure


Time of Disposition: 05:21


Disposition: Home, Self-Care 01


Clinical Impression: 


 Encounter for Guzman catheter replacement








- Discharge Information


Instructions:  Indwelling Urinary Catheter Care, Adult


Referrals: 


Claudy Jenkins MD [Primary Care Provider] - 


Forms:  ED Department Discharge


Care Plan Goals: 


Follow-up with your primary care provider as needed.  Arrange for catheter 

exchange in 3 weeks unless it becomes plugged beforehand.





Sepsis Event Note (ED)





- Focused Exam


Vital Signs: 


                                   Vital Signs











  Temp Pulse Resp BP Pulse Ox


 


 07/11/21 04:29  35.8 C L  87  18  201/112 H  98














- Problem List & Annotations


(1) Encounter for Guzman catheter replacement


SNOMED Code(s): 173426734, 406424661


   Code(s): Z46.6 - ENCOUNTER FOR FITTING AND ADJUSTMENT OF URINARY DEVICE   

Status: Acute   Priority: Low   Current Visit: Yes   





- Problem List Review


Problem List Initiated/Reviewed/Updated: Yes





- My Orders


Last 24 Hours: 


My Active Orders





07/11/21 04:23


Urinary Catheter Assessment [RC] ASDIRECTED 





07/11/21 04:30


Guzman Catheter Insertion [Insert Urinary Catheter] [OM.PC] Q24H 














- Assessment/Plan


Last 24 Hours: 


My Active Orders





07/11/21 04:23


Urinary Catheter Assessment [RC] ASDIRECTED 





07/11/21 04:30


Guzman Catheter Insertion [Insert Urinary Catheter] [OM.PC] Q24H

## 2021-09-20 NOTE — EDM.PDOC
ED HPI GENERAL MEDICAL PROBLEM





- General


Chief Complaint: Genitourinary Problem


Stated Complaint: CATHeter ISSUES


Time Seen by Provider: 09/20/21 04:56


Source of Information: Reports: Patient


History Limitations: Reports: No Limitations





- History of Present Illness


INITIAL COMMENTS - FREE TEXT/NARRATIVE: 





Patient presents emergency room today secondary to concern about need for Guerrero 

catheter to be changed out.  He states it was last changed on Tuesday he does 

have a problem with increased bladder sediment which clogs his chronic Guerrero 

catheters.  He does use the medication every other day in which he injects into 

his catheter for decreasing sediment he thinks that he may have to go to daily 

use of this.  He gives a history of events tonight around midnight getting up in

emptying his Guerrero catheter bag as it was full and then around 130 he got up to 

empty it again but noticed increasing suprapubic pain and pressure tried 

flushing because there was no drainage she was unable to get that get it to 

drain and since then has increasing pain until he finally came to the ER for 

further care.  He does come in here on a fairly regular basis per ER nurse for 

the same complaint





PMH/Meds--reviewed in EMR


Allergies--Abacavir





Onset: Today, Sudden


Onset Time: 00:30


  ** bladder


Pain Score (Numeric/FACES): 10





- Related Data


                                    Allergies











Allergy/AdvReac Type Severity Reaction Status Date / Time


 


abacavir Allergy Severe Fever Verified 09/20/21 04:55











Home Meds: 


                                    Home Meds





Alpha-D-Galactosidase [Beano] 2 each PO ASDIRECTED PRN 05/30/14 [History]


Ascorbic Acid [Vitamin C] 500 mg PO BEDTIME 05/30/14 [History]


Aspirin [Adult Low Dose Aspirin EC] 81 mg PO DAILY 05/30/14 [History]


Calcium Carbonate/Vitamin D3 [Calcium 600 + Vit D Tablet] 1 each PO BEDTIME 

05/30/14 [History]


Cyanocobalamin (Vitamin B-12) [Vitamin B-12] 500 mcg PO BEDTIME 05/30/14 

[History]


Diphenoxylate HCl/Atropine [Lomotil] 1 - 2 tab PO TID PRN 05/30/14 [History]


Efavirenz [Sustiva] 600 mg PO BEDTIME 05/30/14 [History]


Fish Oil/Omega-3 Fatty Acids [Fish Oil 1,000 MG] 1 each PO BEDTIME 05/30/14 

[History]


Levothyroxine Sodium [Synthroid] 175 mcg PO BEDTIME 05/30/14 [History]


Psyllium with Sucrose [Metamucil] 1 each PO ASDIRECTED PRN 05/30/14 [History]


Raltegravir [Isentress] 400 mg PO BID 05/30/14 [History]


lamiVUDine/Zidovudine [Combivir 150-300 MG] 1 tab PO BID 05/30/14 [History]


Alendronate Sodium [Fosamax] 70 mg PO WEEKLY 11/15/17 [History]


Amoxicillin 1 tab PO ASDIRECTED PRN 11/15/17 [History]


Baclofen 10 mg PO BID 11/15/17 [History]


Finasteride 5 mg PO BEDTIME 11/15/17 [History]


amLODIPine [Norvasc] 2.5 mg PO BEDTIME 05/20/18 [History]


Clopidogrel Bisulfate [Clopidogrel] 75 mg PO DAILY 06/10/21 [History]


Potassium Citrate 10 meq PO Q8H #90 tab.er 06/18/21 [Rx]











Past Medical History


HEENT History: Reports: Impaired Vision


Cardiovascular History: Reports: Hypertension, PVD, Other (See Below)


Other Cardiovascular History: ?leaky valve?


Respiratory History: Reports: None


Gastrointestinal History: Reports: Chronic Constipation, Other (See Below)


Other Gastrointestinal History: tumor 2009 removal in left groin, right groin 

tumor removal in 2014, right sided lymphedema


Genitourinary History: Reports: Chronic Renal Insuffiency, Prostate Disorder, 

Other (See Below)


Other Genitourinary History: cystogram of bladder 2017


Musculoskeletal History: Reports: Fracture


Other Musculoskeletal History: right hip 2015 screws in hip


Neurological History: Reports: Other (See Below)


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


Endocrine/Metabolic History: Reports: Hypothyroidism


Hematologic History: Reports: Anemia, Blood Transfusion(s)


Immunologic History: Reports: HIV


Oncologic (Cancer) History: Reports: Lung, Squamous Cell Carcinoma, Other (See 

Below)


Other Oncologic History: anal cancer


Dermatologic History: Reports: Cellulitis





- Infectious Disease History


Infectious Disease History: Reports: Chicken Pox, HIV-Human Immunodeficiency 

Virus, Measles





- Past Surgical History


Head Surgeries/Procedures: Reports: None


HEENT Surgical History: Reports: None


Cardiovascular Surgical History: Reports: None, Carotid Endarterectomy


Respiratory Surgical History: Reports: Lung Resection


Other Respiratory Surgeries/Procedures: right lung resection 2008


GI Surgical History: Reports: Colonoscopy, Other (See Below)


Other GI Surgeries/Procedures: anal cancer in 2009 treated with radiation and 

chemo


Endocrine Surgical History: Reports: None


Neurological Surgical History: Reports: None


Musculoskeletal Surgical History: Reports: None


Oncologic Surgical History: Reports: None


Dermatological Surgical History: Reports: None





Social & Family History





- Caffeine Use


Caffeine Use: Reports: Coffee





ED ROS GENERAL





- Review of Systems


Review Of Systems: Comprehensive ROS is negative, except as noted in HPI.


: Reports: Urinary Retention (blocked guerrero)





ED EXAM, RENAL/





- Physical Exam


Exam: See Below


Exam Limited By: No Limitations


General Appearance: Alert, WD/WN, Moderate Distress (secondary to acute urinary 

retension/blocked guerrero)


Ears: Hearing Grossly Normal


Head: Atraumatic, Normocephalic


Neck: Normal Inspection, Supple


Respiratory/Chest: No Respiratory Distress, Lungs Clear, Normal Breath Sounds


Cardiovascular: Regular Rate, Rhythm, No Murmur


GI/Abdominal: Normal Bowel Sounds, Soft, Non-Tender (after guerrero replaced)


 (Male) Exam: Deferred


Rectal (Males) Exam: Deferred


Extremities: Pedal Edema, Limited Range of Motion, Other (has limited 

ambulation, uses 4-point cane for assistance)


Neurological: Alert, Oriented, Normal Cognition, No Motor/Sensory Deficits, 

Abnormal Gait (has limited ambulation, uses 4-point cane for assistance)


Psychiatric: Normal Affect, Normal Mood


Skin Exam: Warm, Dry, Intact, Normal Color





Course





- Vital Signs


Text/Narrative:: 





0529--guerrero changed at patient request, only 14Fr available (he states he 

usually uses 16Fr--recommended he contact his urologist to have this changed out

 in clinic this next week as likely leak around).  he verbalized 

understanding/agreement with plan of care, ready to d/c





- Orders/Labs/Meds


Orders: 


                               Active Orders 24 hr











 Category Date Time Status


 


 Guerrero Catheter Insertion [Insert Urinary Catheter] [OM. Care  09/20/21 05:15 

Ordered





 PC] Q24H   


 


 Urinary Catheter Assessment [RC] ASDIRECTED Care  09/20/21 05:04 Active














Departure





- Departure


Time of Disposition: 05:29


Disposition: Home, Self-Care 01


Condition: Good


Clinical Impression: 


 Retention of urine, Indwelling Guerrero catheter present





Obstructed Guerrero catheter


Qualifiers:


 Encounter type: initial encounter Qualified Code(s): T83.091A - Other 

mechanical complication of indwelling urethral catheter, initial encounter








- Discharge Information


*PRESCRIPTION DRUG MONITORING PROGRAM REVIEWED*: Not Applicable


*COPY OF PRESCRIPTION DRUG MONITORING REPORT IN PATIENT MOLLY: Not Applicable


Instructions:  Acute Urinary Retention, Male, Easy-to-Read


Referrals: 


Claudy Jenkins MD [Primary Care Provider] - 


Forms:  ED Department Discharge


Additional Instructions: 


As discussed, it is recommended you contact your Urology clinic for guerrero change

out this week due to size placed in ER (14FR) being smaller than your normal 

(16FR)--further chronic management as per Urology direction





Return to the ER for any further acute concerns as indicated





- My Orders


Last 24 Hours: 


My Active Orders





09/20/21 05:04


Urinary Catheter Assessment [RC] ASDIRECTED 





09/20/21 05:15


Guerrero Catheter Insertion [Insert Urinary Catheter] [OM.PC] Q24H 














- Assessment/Plan


Last 24 Hours: 


My Active Orders





09/20/21 05:04


Urinary Catheter Assessment [RC] ASDIRECTED 





09/20/21 05:15


Guerrero Catheter Insertion [Insert Urinary Catheter] [OM.PC] Q24H

## 2021-12-31 NOTE — EDM.PDOC
ED HPI GENERAL MEDICAL PROBLEM





- General


Chief Complaint: General


Stated Complaint: LEG PAIN, UNABLE TO WALK


Time Seen by Provider: 12/31/21 14:15


Source of Information: Reports: Patient, Old Records, RN


History Limitations: Reports: No Limitations





- History of Present Illness


INITIAL COMMENTS - FREE TEXT/NARRATIVE: 





57 yo male presents with progressive weakness and a HA since this past Monday. 

The weakness was not problematic until last night, now not able to safely walk. 

He has taken acetaminophen for the HA today with partial relief. No fever. Has 

an indwelling guerrero. No nausea or diarrhea. No cough or SOB. No abdominal pain. 

His appetite is normal. Has a pHx of HIV and lung CA.


Onset: Gradual


Onset Date: 12/27/21


Duration: Day(s):, Getting Worse


Location: Reports: Head, Generalized


Quality: Reports: Ache (head)


Severity: Moderate (weakness and HA)


Improves with: Reports: Medication (acetaminophen helped the HA some)


Worsens with: Reports: Other (unknown)


Context: Reports: Other (See HPI)


Associated Symptoms: Reports: Headaches, Weakness.  Denies: Confusion, Cough, 

Fever/Chills, Loss of Appetite, Nausea/Vomiting, Shortness of Breath


Treatments PTA: Reports: Acetaminophen (one tab)


  ** Generalized


Pain Score (Numeric/FACES): 7





- Related Data


                                    Allergies











Allergy/AdvReac Type Severity Reaction Status Date / Time


 


abacavir Allergy Severe Fever Verified 09/20/21 04:55











Home Meds: 


                                    Home Meds





Alpha-D-Galactosidase [Beano] 2 each PO ASDIRECTED PRN 05/30/14 [History]


Ascorbic Acid [Vitamin C] 500 mg PO BEDTIME 05/30/14 [History]


Calcium Carbonate/Vitamin D3 [Calcium 600 + Vit D Tablet] 1 each PO BEDTIME 

05/30/14 [History]


Cyanocobalamin (Vitamin B-12) [Vitamin B-12] 500 mcg PO BEDTIME 05/30/14 

[History]


Efavirenz [Sustiva] 600 mg PO BEDTIME 05/30/14 [History]


Levothyroxine Sodium [Synthroid] 175 mcg PO BEDTIME 05/30/14 [History]


Psyllium with Sucrose [Metamucil] 1 each PO ASDIRECTED PRN 05/30/14 [History]


Raltegravir [Isentress] 400 mg PO BID 05/30/14 [History]


lamiVUDine/Zidovudine [Combivir 150-300 MG] 1 tab PO BID 05/30/14 [History]


Baclofen 10 mg PO BID 11/15/17 [History]


Finasteride 5 mg PO BEDTIME 11/15/17 [History]


amLODIPine [Norvasc] 2.5 mg PO BEDTIME 05/20/18 [History]


Clopidogrel Bisulfate [Clopidogrel] 75 mg PO DAILY 06/10/21 [History]


Ciprofloxacin HCl 250 mg PO Q12H #11 tablet 12/31/21 [Rx]











Past Medical History


HEENT History: Reports: Impaired Vision


Cardiovascular History: Reports: Hypertension, PVD, Other (See Below)


Other Cardiovascular History: ?leaky valve?


Respiratory History: Reports: None


Gastrointestinal History: Reports: Chronic Constipation, Other (See Below)


Other Gastrointestinal History: tumor 2009 removal in left groin, right groin 

tumor removal in 2014, right sided lymphedema


Genitourinary History: Reports: Chronic Renal Insuffiency, Prostate Disorder, 

Other (See Below)


Other Genitourinary History: cystogram of bladder 2017


Musculoskeletal History: Reports: Fracture


Other Musculoskeletal History: right hip 2015 screws in hip


Neurological History: Reports: Other (See Below)


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


Endocrine/Metabolic History: Reports: Hypothyroidism


Hematologic History: Reports: Anemia, Blood Transfusion(s)


Immunologic History: Reports: HIV


Oncologic (Cancer) History: Reports: Lung, Squamous Cell Carcinoma, Other (See 

Below)


Other Oncologic History: anal cancer


Dermatologic History: Reports: Cellulitis





- Infectious Disease History


Infectious Disease History: Reports: Chicken Pox, HIV-Human Immunodeficiency 

Virus, Measles





- Past Surgical History


Head Surgeries/Procedures: Reports: None


HEENT Surgical History: Reports: None


Cardiovascular Surgical History: Reports: None, Carotid Endarterectomy


Respiratory Surgical History: Reports: Lung Resection


Other Respiratory Surgeries/Procedures: right lung resection 2008


GI Surgical History: Reports: Colonoscopy, Other (See Below)


Other GI Surgeries/Procedures: anal cancer in 2009 treated with radiation and 

chemo


Endocrine Surgical History: Reports: None


Neurological Surgical History: Reports: None


Musculoskeletal Surgical History: Reports: None


Oncologic Surgical History: Reports: None


Dermatological Surgical History: Reports: None





Social & Family History





- Tobacco Use


Tobacco Use Status *Q: Former Tobacco User


Used Tobacco, but Quit: Yes


Month/Year Tobacco Last Used: 10/2008





- Caffeine Use


Caffeine Use: Reports: Coffee





- Recreational Drug Use


Recreational Drug Use: No





ED ROS GENERAL





- Review of Systems


Review Of Systems: See Below


Constitutional: Reports: Malaise, Weakness.  Denies: Fever, Chills, Diaphoresis


HEENT: Reports: No Symptoms


Respiratory: Reports: No Symptoms


Cardiovascular: Reports: No Symptoms


Endocrine: Reports: No Symptoms


GI/Abdominal: Reports: No Symptoms


: Reports: No Symptoms


Musculoskeletal: Reports: No Symptoms


Skin: Reports: No Symptoms


Neurological: Reports: Headache


Psychiatric: Reports: No Symptoms





ED EXAM, GENERAL





- Physical Exam


Exam: See Below


Exam Limited By: No Limitations


General Appearance: Alert, WD/WN, No Apparent Distress, Thin


Eye Exam: Bilateral Eye: Normal Inspection


Ears: Normal External Exam, Normal Canal, Hearing Grossly Normal, Normal TMs


Ear Exam: Bilateral Ear: Auricle Normal, Canal Normal, TM normal


Nose: Normal Inspection, No Blood


Throat/Mouth: Normal Inspection, Normal Lips, Normal Oropharynx, Normal Voice, 

No Airway Compromise


Head: Atraumatic, Normocephalic


Neck: Normal Inspection


Respiratory/Chest: No Respiratory Distress, Lungs Clear, Normal Breath Sounds, 

No Accessory Muscle Use


Cardiovascular: Regular Rate, Rhythm, No Edema


GI/Abdominal: Normal Bowel Sounds, Soft, Non-Tender, No Distention.  No: 

Distended


Back Exam: Normal Inspection


Extremities: Normal Inspection, Normal Range of Motion, Non-Tender, No Pedal 

Edema


Neurological: Alert, Oriented, CN II-XII Intact, Normal Cognition, No 

Motor/Sensory Deficits


Psychiatric: Normal Affect, Normal Mood


Skin Exam: Warm, Dry, Intact, Normal Color, No Rash





Course





- Vital Signs


Last Recorded V/S: 


                                Last Vital Signs











Temp  37.2 C   12/31/21 13:56


 


Pulse  72   12/31/21 16:58


 


Resp  18   12/31/21 13:56


 


BP  161/91 H  12/31/21 16:58


 


Pulse Ox  97   12/31/21 13:56














- Orders/Labs/Meds


Orders: 


                               Active Orders 24 hr











 Category Date Time Status


 


 CULTURE URINE [RM] Stat Lab  12/31/21 16:30 Received


 


 NS + KCl 20mEq/L [Normal Saline with 20 mEq KCl] 1,000 Med  12/31/21 15:30 

Active





 ml   





 IV ASDIRECTED   








                                Medication Orders





Potassium Chloride/Sodium Chloride (Normal Saline With 20 Meq Kcl)  1,000 mls @ 

1,000 mls/hr IV ASDIRECTED LAUREN


   Last Admin: 12/31/21 15:42  Dose: 1,000 mls/hr


   Documented by: ESTEFANÍA








Labs: 


                                Laboratory Tests











  12/31/21 12/31/21 12/31/21 Range/Units





  14:45 14:45 14:45 


 


WBC  10.3    (4.5-11.0)  K/uL


 


RBC  4.31    (4.30-5.90)  M/uL


 


Hgb  16.9 H D    (12.0-15.0)  g/dL


 


Hct  45.7    (40.0-54.0)  %


 


MCV  106 H    (80-98)  fL


 


MCH  39 H    (27-31)  pg


 


MCHC  37 H    (32-36)  %


 


Plt Count  149 L    (150-400)  K/uL


 


Sodium   134 L   (140-148)  mmol/L


 


Potassium   3.8   (3.6-5.2)  mmol/L


 


Chloride   100   (100-108)  mmol/L


 


Carbon Dioxide   22   (21-32)  mmol/L


 


Anion Gap   15.8 H   (5.0-14.0)  mmol/L


 


BUN   25 H   (7-18)  mg/dL


 


Creatinine   1.4 H   (0.8-1.3)  mg/dL


 


Est Cr Clr Drug Dosing   55.64   mL/min


 


Estimated GFR (MDRD)   52 L   (>60)  


 


Glucose   130 H   ()  mg/dL


 


Calcium   8.9   (8.5-10.1)  mg/dL


 


Magnesium     (1.8-2.4)  mg/dL


 


Troponin I High Sens   30.4   (<=60.3)  pg/mL


 


TSH, Ultra Sensitive    0.333 L  (0.358-3.740)  uIU/mL


 


Urine Color     (YELLOW)  


 


Urine Appearance     (CLEAR)  


 


Urine pH     (5.0-8.0)  


 


Ur Specific Gravity     (1.008-1.030)  


 


Urine Protein     (NEGATIVE)  mg/dL


 


Urine Glucose (UA)     (NEGATIVE)  mg/dL


 


Urine Ketones     (NEGATIVE)  mg/dL


 


Urine Occult Blood     (NEGATIVE)  


 


Urine Nitrite     (NEGATIVE)  


 


Urine Bilirubin     (NEGATIVE)  


 


Urine Urobilinogen     (0.2-1.0)  EU/dL


 


Ur Leukocyte Esterase     (NEGATIVE)  


 


Urine RBC     (0-5)  


 


Urine WBC     (0-5)  


 


Ur Epithelial Cells     


 


Amorphous Sediment     


 


Urine Bacteria     


 


Urine Mucus     


 


Urine Other     














  12/31/21 12/31/21 Range/Units





  15:23 15:44 


 


WBC    (4.5-11.0)  K/uL


 


RBC    (4.30-5.90)  M/uL


 


Hgb    (12.0-15.0)  g/dL


 


Hct    (40.0-54.0)  %


 


MCV    (80-98)  fL


 


MCH    (27-31)  pg


 


MCHC    (32-36)  %


 


Plt Count    (150-400)  K/uL


 


Sodium    (140-148)  mmol/L


 


Potassium    (3.6-5.2)  mmol/L


 


Chloride    (100-108)  mmol/L


 


Carbon Dioxide    (21-32)  mmol/L


 


Anion Gap    (5.0-14.0)  mmol/L


 


BUN    (7-18)  mg/dL


 


Creatinine    (0.8-1.3)  mg/dL


 


Est Cr Clr Drug Dosing    mL/min


 


Estimated GFR (MDRD)    (>60)  


 


Glucose    ()  mg/dL


 


Calcium    (8.5-10.1)  mg/dL


 


Magnesium  2.6 H   (1.8-2.4)  mg/dL


 


Troponin I High Sens    (<=60.3)  pg/mL


 


TSH, Ultra Sensitive    (0.358-3.740)  uIU/mL


 


Urine Color   Yellow  (YELLOW)  


 


Urine Appearance   Cloudy A  (CLEAR)  


 


Urine pH   7.0  (5.0-8.0)  


 


Ur Specific Gravity   1.025  (1.008-1.030)  


 


Urine Protein   >=300 H  (NEGATIVE)  mg/dL


 


Urine Glucose (UA)   Negative  (NEGATIVE)  mg/dL


 


Urine Ketones   40 H  (NEGATIVE)  mg/dL


 


Urine Occult Blood   Large H  (NEGATIVE)  


 


Urine Nitrite   Positive H  (NEGATIVE)  


 


Urine Bilirubin   Negative  (NEGATIVE)  


 


Urine Urobilinogen   0.2  (0.2-1.0)  EU/dL


 


Ur Leukocyte Esterase   Trace H  (NEGATIVE)  


 


Urine RBC   Semi-packed H  (0-5)  


 


Urine WBC   Semi-packed H  (0-5)  


 


Ur Epithelial Cells   Rare  


 


Amorphous Sediment   Not seen  


 


Urine Bacteria   Many  


 


Urine Mucus   Not seen  


 


Urine Other     











Meds: 


Medications











Generic Name Dose Route Start Last Admin





  Trade Name Freq  PRN Reason Stop Dose Admin


 


Potassium Chloride/Sodium Chloride  1,000 mls @ 1,000 mls/hr  12/31/21 15:30  

12/31/21 15:42





  Normal Saline With 20 Meq Kcl  IV   1,000 mls/hr





  ASDIRECTED LAUREN   Administration














Discontinued Medications














Generic Name Dose Route Start Last Admin





  Trade Name Dilia  PRN Reason Stop Dose Admin


 


Hydrocodone Bitart/Acetaminophen  1 tab  12/31/21 14:33  12/31/21 14:40





  Acetaminophen/Hydrocodone 325-5 Mg Tab  PO  12/31/21 14:34  1 tab





  ONETIME ONE   Administration


 


Ciprofloxacin  500 mg  12/31/21 16:42  12/31/21 16:50





  Ciprofloxacin 500 Mg Tab  PO  12/31/21 16:43  500 mg





  ONETIME ONE   Administration














Departure





- Departure


Time of Disposition: 17:10


Disposition: Home, Self-Care 01


Condition: Fair


Clinical Impression: 


 Mild dehydration, Weakness, Low TSH level





UTI (urinary tract infection)


Qualifiers:


 Urinary tract infection type: site unspecified Hematuria presence: without 

hematuria Qualified Code(s): N39.0 - Urinary tract infection, site not specified








- Discharge Information


*PRESCRIPTION DRUG MONITORING PROGRAM REVIEWED*: Not Applicable


*COPY OF PRESCRIPTION DRUG MONITORING REPORT IN PATIENT MOLLY: Not Applicable


Prescriptions: 


Ciprofloxacin HCl 250 mg PO Q12H #11 tablet


Referrals: 


PCP,None [Primary Care Provider] - 


Forms:  ED Department Discharge


Additional Instructions: 


Take Cipro every 12 hrs until gone. Drink more fluids than you had been 

drinking. Skip your thyroid medicine on Saturdays and let your provider know. 

Recheck if worse and talk to your doctor on Monday about your urine culture 

result. 





Sepsis Event Note (ED)





- Evaluation


Sepsis Screening Result: No Definite Risk





- Focused Exam


Vital Signs: 


                                   Vital Signs











  Temp Pulse Resp BP Pulse Ox


 


 12/31/21 16:58   72   161/91 H 


 


 12/31/21 15:56   70   152/90 H 


 


 12/31/21 13:56  37.2 C  75  18  180/95 H  97














- My Orders


Last 24 Hours: 


My Active Orders





12/31/21 15:30


NS + KCl 20mEq/L [Normal Saline with 20 mEq KCl] 1,000 ml IV ASDIRECTED 





12/31/21 16:30


CULTURE URINE [RM] Stat 














- Assessment/Plan


Last 24 Hours: 


My Active Orders





12/31/21 15:30


NS + KCl 20mEq/L [Normal Saline with 20 mEq KCl] 1,000 ml IV ASDIRECTED 





12/31/21 16:30


CULTURE URINE [RM] Stat